# Patient Record
Sex: FEMALE | NOT HISPANIC OR LATINO
[De-identification: names, ages, dates, MRNs, and addresses within clinical notes are randomized per-mention and may not be internally consistent; named-entity substitution may affect disease eponyms.]

---

## 2021-01-25 ENCOUNTER — ERX REFILL RESPONSE (OUTPATIENT)
Age: 36
End: 2021-01-25

## 2021-01-25 RX ORDER — SULFASALAZINE 500 MG/1
TAKE 12 TABLETS BY ORAL ROUTE DAILY FOR 90 DAYS TABLET ORAL
Qty: 360 | Refills: 3

## 2021-05-03 ENCOUNTER — TELEPHONE ENCOUNTER (OUTPATIENT)
Dept: URBAN - METROPOLITAN AREA CLINIC 92 | Facility: CLINIC | Age: 36
End: 2021-05-03

## 2021-05-03 ENCOUNTER — OFFICE VISIT (OUTPATIENT)
Dept: URBAN - METROPOLITAN AREA TELEHEALTH 2 | Facility: TELEHEALTH | Age: 36
End: 2021-05-03
Payer: COMMERCIAL

## 2021-05-03 ENCOUNTER — LAB OUTSIDE AN ENCOUNTER (OUTPATIENT)
Dept: URBAN - METROPOLITAN AREA TELEHEALTH 2 | Facility: TELEHEALTH | Age: 36
End: 2021-05-03

## 2021-05-03 DIAGNOSIS — D72.820 LYMPHOCYTOSIS: ICD-10-CM

## 2021-05-03 DIAGNOSIS — K50.80 CROHN'S DISEASE OF BOTH SMALL AND LARGE INTESTINE: ICD-10-CM

## 2021-05-03 DIAGNOSIS — Z91.89 COLON CANCER HIGH RISK: ICD-10-CM

## 2021-05-03 PROCEDURE — 99215 OFFICE O/P EST HI 40 MIN: CPT | Performed by: INTERNAL MEDICINE

## 2021-05-03 RX ORDER — AZATHIOPRINE 50 MG/1
AS DIRECTED TABLET ORAL QD
Qty: 30 | Refills: 2 | OUTPATIENT
Start: 2021-05-03 | End: 2021-08-01

## 2021-05-03 RX ORDER — POLYETHYLENE GLYCOL 3350, SODIUM SULFATE, SODIUM CHLORIDE, POTASSIUM CHLORIDE, ASCORBIC ACID, SODIUM ASCORBATE 140-9-5.2G
1 KIT KIT ORAL ONCE
Qty: 1 | Refills: 1 | OUTPATIENT
Start: 2021-05-03 | End: 2021-05-05

## 2021-05-03 RX ORDER — SULFASALAZINE 500 MG/1
TAKE 12 TABLETS BY ORAL ROUTE DAILY FOR 90 DAYS TABLET ORAL
Qty: 360 | Refills: 3 | Status: ACTIVE | COMMUNITY

## 2021-05-03 NOTE — HPI-TODAY'S VISIT:
Mrs Lowery is a 35 year old  individual who presents for follow up of UC with sophie-rectal fistula, on sulfasalazine 6 grams and imuran 100 mg, (started 6/3/2015 and stopped ), here for follow-up. . She has a history of development of rashes on her face, legs, and neck in -10.  The symptoms would come and go.  No formal diagnosis was made.  She was given supportive management and the rash resolved. . She reports development of oral ulcers in 2013.  She had the symptoms for one month.  She used anti-virals (including valtrex). . She developed rectal bleeding in early .  She had a colonoscopy performed in 2014.  The biopsies revelaed infectious colitis. A colonoscopy was also performed 2014.  It revealed a normal terminal ileum, ulcerations within the ascending colon and hepatic flexure (with my interpretation of normal surrounding mucosa based on the pictures), and inflammation within the rectum.  Biopsies from the ascending colon showed some chronic inflammation consisting of lymphocytes and plasma cells being increased in the lamina propria.  The architectural changes and gland dropout were mild.  Possibility of UC was raised.  Biopsies of the rectum showed occasional neutrophils with no chronic inflammation or architectural changes.  The possibility of an infectious colitis or early IBD was raised. She started prednisone on 2015, 40mg, which has helped her oral ulcers.  Since starting the prednisone, her bowel movements improved, too. She was started on imuran 50mg and Lialda on 6/10/2015. We performed a colonoscopy on 2015 at [facility].  She had Grande 0 to 1 endoscopic disease with biopsies showing chronic colitis with mild architectural distortion in the rectum and normal biopsies throughout the remainder of the colon.  The biopsies, when compared to previous, were improved. She was also diagnosed with clostridium difficile and given a course of vancomycin and flagyl.  She had a colonoscopy performed on 2015.  She had Grande 0 to 1 endoscopic disease with biopsies showing chronic colitis with mild architectural distortion in the rectum and normal biopsies throughout the remainder of the colon.  The biopsies, when compared to previous, were improved. She was also diagnosed with clostridium difficile and given a course of vancomycin and flagyl. She had  delivery on 2016.  The rash she had developed on her skin only improved in 2018. Her UC has been stable; 1 bowel movement per day, no rectal bleeding.  She is 16 weeks pregnant at this time. Previously on 2020, pt reports that she has normal BM, no pain, no fistula drainage.  She has gone weeks without going on therapy.  Some fatigue. . Today on 5/3/2021, pt reports that after getting the second COVID shot, she had systemic response.  She also started to develop ulcers in her mouth as well.  She started back on the Imuran 2 weeks ago because of this.  No diarrhea, no rectal bleeding present.  Lot of pain as well in her throat.  Lymph nodes are swollen as well, lot of systemic fatigue.  Was given Medrol dose pack and augmentin plus norco, which helped her significantly. . PMH: Anal fissures, UC MEDICATIONS: Cymbalta 60mg; Luvox 100mg; Sulfasalazine . Social History:  No smoke or etoh; Lives with  in [place]; ; Phone:[phone] Family History:  Mom with colon polyp; no GI Malignancy or IBD . LABS/Studies:  [facility] labs: 2021: wbc 10.5, Hgb 13.6, Plt 155, ,  . Results for ALIREZA LOWERY (MRN [id number]) as of 2017 11:05  Ref. Range 2016 11:34 5/10/2017 12:19 Glucose Latest Ref Range: 74 - 106 mg/dL 84 91 Sodium Latest Ref Range: 136 - 145 mmol/L 137 143 Potassium Latest Ref Range: 3.4 - 4.5 mmol/L 4.4 4.8 (H) Chloride Latest Ref Range: 98 - 107 mmol/L 101 103 CO2 Latest Ref Range: 22 - 29 mmol/L 23 24 Anion Gap Latest Ref Range: 6.0 - 14.0 mmol/L 13.0 16.0 (H) BUN Latest Ref Range: 6.0 - 20.0 mg/dL 6.0 14.0 Creatinine Serum/WB Latest Ref Range: 0.51 - 0.95 mg/dL 0.49 (L) 0.61 eGFR Latest Units: ml/min/1.73m >60 >=60 Calcium Latest Ref Range: 8.6 - 10.2 mg/dL 8.9 9.2 Total Protein Latest Ref Range: 6.6 - 8.7 g/dL 6.7 7.3 Albumin Latest Ref Range: 3.5 - 5.5 g/dL 3.9 4.7 Alkaline Phosphatase Latest Ref Range: 35 - 104 U/L 73 94 Total Bilirubin Latest Ref Range: 0.15 - 1.00 mg/dL 0.33 0.28 ALT (SGPT) Latest Ref Range: 0 - 33 U/L 6 8 AST (SGOT) Latest Ref Range: 0 - 32 U/L 18 18 Ferritin Latest Ref Range: 13 - 150 ng/mL  98 Iron Latest Ref Range: 37 - 145 mcg/dL  53 Total Iron Binding Capacity Latest Ref Range: 250 - 450 mcg/dL  191 (L) Unsaturated Iron Binding Capacity Latest Ref Range: 112 - 346 ug/dL  138 Iron Saturation Latest Ref Range: 15 - 50 %  28 Vitamin B-12 Latest Ref Range: 213 - 816 pg/mL  373 White Blood Cell Count Latest Ref Range: 4.0 - 10.0 TH/cmm 15.0 (H) 6.8 Red Blood Cell Count Latest Ref Range: 3.80 - 4.80 M/cmm 3.91 4.17 Hemoglobin Latest Ref Range: 12.0 - 15.0 g/dL 12.9 13.3 Hematocrit Latest Ref Range: 36.0 - 46.0 % 39.9 41.8 MCV Latest Ref Range: 83.0 - 101.0 fl 102.0 (H) 100.2 MCH Latest Ref Range: 27.0 - 32.0 pg 33.0 (H) 31.9 MCHC Latest Ref Range: 31.5 - 34.5 g/dL 32.3 31.8 RDW - SD Latest Ref Range: 36.4 - 43.3 fL 47.4 (H) 45.1 (H) Platelet Count Latest Ref Range: 150 - 400 TH/cmm 126 (L) 160 MPV Latest Ref Range: 9.4 - 12.3 fl 11.6 11.8 Segmented Neutrophils Latest Ref Range: 44.0 - 65.0 % 75.5 (H) 56.1 Lymphocytes Latest Ref Range: 25.0 - 46.0 % 14.0 (L) 32.3 Monocytes Latest Ref Range: 1.0 - 10.0 % 4.5 6.0 Eosinophils Latest Ref Range: 0.0 - 9.0 % 1.0 4.7 Basophils Latest Ref Range: 0.0 - 4.0 % 0.7 0.6 IMMATURE GRANULOCYTES Latest Ref Range: 0.0 - 0.6 % 4.3 (H) 0.3 NUCLEATED RBC / AUTO DIFF Latest Ref Range: less than=0.0 /100WBC 0.0 0.0 SEGMENTED NEUTROPHILS ABSOLUTE / AUTO DIFF Latest Units: TH/cmm 11.35 3.80 LYMPHOCYTE ABSOLUTE / AUTO DIFF Latest Units: TH/cmm 2.10 2.19 MONOCYTES ABSOLUTE / AUTO DIFF Latest Units: TH/cmm 0.67 0.41 EOSINOPHIL ABSOLUTE / AUTO DIFF Latest Units: TH/cmm 0.15 0.32 BASOPHIL ABSOLUTE / AUTO DIFF Latest Units: TH/cmm 0.11 0.04 IMMATURE GRANULOCYTES ABSOLUTE / AUTO DIFF Latest Units: TH/cmm 0.65 0.02 NRBC ABSOLUTE / AUTO DIFF Latest Units: TH/cmm 0.00 0.00 C-Reactive Protein Latest Ref Range: 0.0 - 0.5 mg/dL 0.1  . MRE abd/pelv 2015: IMPRESSION:  The study is limited by a paucity of intraperitoneal fat. There are no definitive signs of bowel thickening or abnormal bowel enhancement to indicate inflammatory bowel disease.  Physiologic cysts on both ovaries. Nabothian cysts in the uterine cervix. Small polypoid lesion, seen within the endometrial canal measuring less than a centimeter, could represent a polyp or submucosal fibroid . Procedures:  Colonoscopy: 2017: - The sigmoid colon, descending colon, splenic flexure, transverse colon, hepatic flexure, ascending colon and cecum appeared normal. Biopsies were taken with a cold forceps for histology. This was biopsied with a cold forceps for histology. The pathology specimen was placed into Bottle Number 1. - A localized area of granular mucosa was found in the rectum. Small inflammatory polyp also biopsied. Biopsies were taken with a cold forceps for histology. The pathology specimen was placed into Bottle Number 2. 1.  COLON, NOS:  Colon mucosa.  No colitis or dysplasia identified. 2.  RECTUM:  Mild and focal mild to moderate active chronic proctitis with mild crypt distortion.  No dysplasia identified.  . 2015: Impressions: Normal terminal ileum. Multiple biopsies taken. Normal cecum, ascending colon, hepatic flexure, and transverse colon. Normal vascular pattern. Grande Score 0. Multiple biopsies taken. Normal sigmoid colon.  Normal vascular pattern. Grande Score 0. Multiple biopsies taken. Previous fistula in the rectum was not seen at this time. Mildly erythematous mucosa was found in the rectum. Grande Score 1. Four biopsies taken. Overall, there is significant improvement in appearance of mucosa. . Final Pathologic Diagnosis 1. ILEUM (BIOPSY): Ileal mucosa with no diagnostic alterations. 2. CECUM AND TRANSVERSE COLON (BIOPSY): Fragments of colonic mucosa with mild architectural distortion; no active colitis or dysplasia present; slides from [id number] (6/8/15) are reviewed and there has been an interval improvement in activity, which at that time showed mild to moderately active chronic colitis. 3. SIGMOID (BIOPSY): Fragments of colonic mucosa with no diagnostic alterations; no evidence of colitis; no dysplasia present; slides from [id number] (6/8/15) are reviewed and there has been an interval improvement in activity, which at that time showed moderately active chronic colitis.  4. RECTUM (BIOPSY): Chronic colitis with mild architectural distortion and increased inflammatory cells within the lamina propria; no active colitis identified; no dysplasia present; slides from [id number] (6/8/15) are reviewed and there has been an interval improvement in activity, which at that time showed moderately active chronic colitis. . 2015: Findings: The terminal ileum appeared to be unremarkable. Multiple biopsies were taken from the colon (jar 1). The cecum appeared to be unremarkable. Multiple cold forceps biopsies were taken from the colon  (jar 2). The ascending colon appeared to be unremarkable. Multiple cold forceps biopsies were taken from the colon (jar 3). The transverse colon appeared to be unremarkable. Multiple biopsies were taken from the colon (jar 4). The descending colon appeared to be unremarkable. Multiple biopsies were taken from the colon (jar 5). Mildly erythematous mucosa was found in the sigmoid colon. Multiple biopsies were taken (jar 6).  Mildly erythematous mucosa was found in the rectum. Multiple biopsies were taken (jar 7). A possible fistula was found in the rectum. There was some possible pus like material that was seen and sent for culture. There was a small ulcer at the site and biopsies were taken (jar 8). External hemorrhoids were found. Presence of possible anal fissure. . Final Pathologic Diagnosis 1. ILEUM, BIOPSY: SMALL BOWEL MUCOSA WITHOUT SIGNIFICANT HISTOPATHOLOGIC ABNORMALITY 2. CECUM, BIOPSY: CHRONIC ACTIVE COLITIS, MILD TO MODERATE 3. ASCENDING COLON, BIOPSY: CHRONIC ACTIVE COLITIS, MILD TO MODERATE PANNETH CELL METAPLASIA PRESENT 4. TRANSVERSE COLON, BIOPSY: CHRONIC ACTIVE COLITIS, MODERATE 5. DESCENDING COLON, BIOPSY: CHRONIC ACTIVE COLITIS, MODERATE RARE POORLY FORMED GRANULOMA PRESENT  6. SIGMOID COLON, BIOPSY: CHRONIC ACTIVE COLITIS, MODERATE RARE POORLY FORMED GRANULOMA PRESENT 7. RECTUM, BIOPSY: CHRONIC ACTIVE COLITIS, MARKED 8. FISTULA, BIOPSY: COLORECTAL MUCOSA SHOWING MODERATE CHRONIC ACTIVE COLITIS . Comment Although not definitive, the histologic findings favor ulcerative colitis over Crohn disease. Clinical correlation recommended. . 2014: Findings: Prep fair; small ulcer in TI--random bx taken x 4; Mild inflammation and friability in cecum--bx x 4; AC was unremarkable. Inflammation and friability in TC (bx taken x 8); friability and inflammation in DC and sigmoid colon --bx taken x 8). Minimal inflammation in rectum; Mild hemmorhoids in anal canal.   1. TERMINAL ILEUM, BIOPSY: Small intestinal mucosa with focal active enteritis. 2. CECUM, BIOPSY: Active colitis with cryptitis and without any crypt distortion most consistent with infectious colitis.  3. TRANSVERSE COLON, BIOPSY: Active colitis with cryptitis and without any crypt distortion most consistent with infectious colitis.  4. DESCENDING AND SIGMOID COLON, BIOPSY: Active colitis with cryptitis and without any crypt distortion most consistent with infectious colitis.  Note: Early inflammatory disease cannot be ruled out. Clinical correlation suggested.  . EGD:  2015: Impressions: Normal esophagus. Normal duodenal bulb, 1st portion of the duodenum, 2nd portion of the duodenum, and 3rd portion of the duodenum.  Multiple biopsies taken. Erythematous mucosa was found in the antrum. Four biopsies taken. A few ulcers were found in the body of the stomach (531.90). Multiple biopsies taken. Possible hypertensive portal gastropathy was found in the body of the stomach (572.8). 10. GASTRIC ANTRUM, BIOPSY: CHRONIC SUPERFICIAL GASTRITIS, MILD NO HELICOBACTER IDENITIFIED ON TOLUIDINE BLUE STAIN, WITH CONTROL RESPONDING APPROPRIATELY 11. GASTRIC BODY, BIOPSY: CHRONIC ACTIVE GASTRITIS, MILD NO HELICOBACTER IDENTIFIED ON TOLUIDINE BLUE STAIN .

## 2021-07-06 ENCOUNTER — WEB ENCOUNTER (OUTPATIENT)
Dept: URBAN - METROPOLITAN AREA CLINIC 98 | Facility: CLINIC | Age: 36
End: 2021-07-06

## 2021-07-06 ENCOUNTER — WEB ENCOUNTER (OUTPATIENT)
Dept: URBAN - METROPOLITAN AREA CLINIC 96 | Facility: CLINIC | Age: 36
End: 2021-07-06

## 2021-07-08 ENCOUNTER — CLAIMS CREATED FROM THE CLAIM WINDOW (OUTPATIENT)
Dept: URBAN - METROPOLITAN AREA CLINIC 4 | Facility: CLINIC | Age: 36
End: 2021-07-08
Payer: COMMERCIAL

## 2021-07-08 ENCOUNTER — OFFICE VISIT (OUTPATIENT)
Dept: URBAN - METROPOLITAN AREA SURGERY CENTER 18 | Facility: SURGERY CENTER | Age: 36
End: 2021-07-08
Payer: COMMERCIAL

## 2021-07-08 DIAGNOSIS — K63.89 POLYP OF ILEUM: ICD-10-CM

## 2021-07-08 DIAGNOSIS — K51.50 CHRONIC LEFT-SIDED ULCERATIVE COLITIS: ICD-10-CM

## 2021-07-08 DIAGNOSIS — K51.911 ULCERATIVE COLITIS, UNSPECIFIED WITH RECTAL BLEEDING: ICD-10-CM

## 2021-07-08 DIAGNOSIS — D12.2 BENIGN NEOPLASM OF ASCENDING COLON: ICD-10-CM

## 2021-07-08 DIAGNOSIS — D12.2 ADENOMA OF ASCENDING COLON: ICD-10-CM

## 2021-07-08 PROCEDURE — 88305 TISSUE EXAM BY PATHOLOGIST: CPT | Performed by: PATHOLOGY

## 2021-07-08 PROCEDURE — 45385 COLONOSCOPY W/LESION REMOVAL: CPT | Performed by: INTERNAL MEDICINE

## 2021-07-08 PROCEDURE — 88342 IMHCHEM/IMCYTCHM 1ST ANTB: CPT | Performed by: PATHOLOGY

## 2021-07-08 PROCEDURE — G8907 PT DOC NO EVENTS ON DISCHARG: HCPCS | Performed by: INTERNAL MEDICINE

## 2021-07-08 PROCEDURE — 88341 IMHCHEM/IMCYTCHM EA ADD ANTB: CPT | Performed by: PATHOLOGY

## 2021-07-08 PROCEDURE — 45380 COLONOSCOPY AND BIOPSY: CPT | Performed by: INTERNAL MEDICINE

## 2021-08-16 ENCOUNTER — OFFICE VISIT (OUTPATIENT)
Dept: URBAN - METROPOLITAN AREA TELEHEALTH 2 | Facility: TELEHEALTH | Age: 36
End: 2021-08-16
Payer: COMMERCIAL

## 2021-08-16 ENCOUNTER — TELEPHONE ENCOUNTER (OUTPATIENT)
Dept: URBAN - METROPOLITAN AREA CLINIC 92 | Facility: CLINIC | Age: 36
End: 2021-08-16

## 2021-08-16 DIAGNOSIS — D72.820 LYMPHOCYTOSIS: ICD-10-CM

## 2021-08-16 DIAGNOSIS — Z09 FOLLOW UP: ICD-10-CM

## 2021-08-16 DIAGNOSIS — K51.00 ULCERATIVE COLITIS, UNIVERSAL, WITHOUT COMPLICATIONS: ICD-10-CM

## 2021-08-16 DIAGNOSIS — E53.8 B12 DEFICIENCY: ICD-10-CM

## 2021-08-16 PROCEDURE — 99215 OFFICE O/P EST HI 40 MIN: CPT | Performed by: INTERNAL MEDICINE

## 2021-08-16 RX ORDER — PREDNISONE 10 MG/1
1 TABLET TABLET ORAL ONCE A DAY
Qty: 7 TABLET | Refills: 0 | OUTPATIENT
Start: 2021-08-16 | End: 2021-08-23

## 2021-08-16 RX ORDER — SULFASALAZINE 500 MG/1
TAKE 12 TABLETS BY ORAL ROUTE DAILY FOR 90 DAYS TABLET ORAL
Qty: 360 | Refills: 3 | COMMUNITY

## 2021-08-16 NOTE — HPI-TODAY'S VISIT:
Mrs Alexis is a 36 year old  individual who presents for follow up of pan-UC with sophie-rectal fistula, on sulfasalazine 6 grams and imuran 100 mg, (started 6/3/2015 and stopped ), here for follow-up. . She has a history of development of rashes on her face, legs, and neck in -10.  The symptoms would come and go.  No formal diagnosis was made.  She was given supportive management and the rash resolved. . She reports development of oral ulcers in 2013.  She had the symptoms for one month.  She used anti-virals (including valtrex). . She developed rectal bleeding in early .  She had a colonoscopy performed in 2014.  The biopsies revelaed infectious colitis. A colonoscopy was also performed 2014.  It revealed a normal terminal ileum, ulcerations within the ascending colon and hepatic flexure (with my interpretation of normal surrounding mucosa based on the pictures), and inflammation within the rectum.  Biopsies from the ascending colon showed some chronic inflammation consisting of lymphocytes and plasma cells being increased in the lamina propria.  The architectural changes and gland dropout were mild.  Possibility of UC was raised.  Biopsies of the rectum showed occasional neutrophils with no chronic inflammation or architectural changes.  The possibility of an infectious colitis or early IBD was raised. She started prednisone on 2015, 40mg, which has helped her oral ulcers.  Since starting the prednisone, her bowel movements improved, too. She was started on imuran 50mg and Lialda on 6/10/2015. We performed a colonoscopy on 2015 at [facility].  She had Grande 0 to 1 endoscopic disease with biopsies showing chronic colitis with mild architectural distortion in the rectum and normal biopsies throughout the remainder of the colon.  The biopsies, when compared to previous, were improved. She was also diagnosed with clostridium difficile and given a course of vancomycin and flagyl. . She had a colonoscopy performed on 2015.  She had Grande 0 to 1 endoscopic disease with biopsies showing chronic colitis with mild architectural distortion in the rectum and normal biopsies throughout the remainder of the colon.  The biopsies, when compared to previous, were improved. She was also diagnosed with clostridium difficile and given a course of vancomycin and flagyl. She had  delivery on 2016.  The rash she had developed on her skin only improved in 2018. Her UC has been stable; 1 bowel movement per day, no rectal bleeding.  She is 16 weeks pregnant at this time. . Previously on 2020, pt reports that she has normal BM, no pain, no fistula drainage.  She has gone weeks without going on therapy.  Some fatigue. . Previously on 5/3/2021, pt reports that after getting the second COVID shot, she had systemic response.  She also started to develop ulcers in her mouth as well.  She started back on the Imuran 2 weeks ago because of this.  No diarrhea, no rectal bleeding present.  Lot of pain as well in her throat.  Lymph nodes are swollen as well, lot of systemic fatigue.  Was given Medrol dose pack and augmentin plus norco, which helped her significantly. . Today on 2021, pt reports that she has had improvement in her flare that she has improved after taking the imuran . PMH: Anal fissures, UC MEDICATIONS: Cymbalta 60mg; Luvox 100mg; Sulfasalazine . Social History:  No smoke or etoh; Lives with  in [place]; ; Phone:[phone] Family History:  Mom with colon polyp; no GI Malignancy or IBD . LABS/Studies:  [facility] labs: 2021: wbc 10.5, Hgb 13.6, Plt 155, ,  . Results for BEVERLEY ALIREZA (MRN [id number]) as of 2017 11:05  Ref. Range 2016 11:34 5/10/2017 12:19 Glucose Latest Ref Range: 74 - 106 mg/dL 84 91 Sodium Latest Ref Range: 136 - 145 mmol/L 137 143 Potassium Latest Ref Range: 3.4 - 4.5 mmol/L 4.4 4.8 (H) Chloride Latest Ref Range: 98 - 107 mmol/L 101 103 CO2 Latest Ref Range: 22 - 29 mmol/L 23 24 Anion Gap Latest Ref Range: 6.0 - 14.0 mmol/L 13.0 16.0 (H) BUN Latest Ref Range: 6.0 - 20.0 mg/dL 6.0 14.0 Creatinine Serum/WB Latest Ref Range: 0.51 - 0.95 mg/dL 0.49 (L) 0.61 eGFR Latest Units: ml/min/1.73m >60 >=60 Calcium Latest Ref Range: 8.6 - 10.2 mg/dL 8.9 9.2 Total Protein Latest Ref Range: 6.6 - 8.7 g/dL 6.7 7.3 Albumin Latest Ref Range: 3.5 - 5.5 g/dL 3.9 4.7 Alkaline Phosphatase Latest Ref Range: 35 - 104 U/L 73 94 Total Bilirubin Latest Ref Range: 0.15 - 1.00 mg/dL 0.33 0.28 ALT (SGPT) Latest Ref Range: 0 - 33 U/L 6 8 AST (SGOT) Latest Ref Range: 0 - 32 U/L 18 18 Ferritin Latest Ref Range: 13 - 150 ng/mL  98 Iron Latest Ref Range: 37 - 145 mcg/dL  53 Total Iron Binding Capacity Latest Ref Range: 250 - 450 mcg/dL  191 (L) Unsaturated Iron Binding Capacity Latest Ref Range: 112 - 346 ug/dL  138 Iron Saturation Latest Ref Range: 15 - 50 %  28 Vitamin B-12 Latest Ref Range: 213 - 816 pg/mL  373 White Blood Cell Count Latest Ref Range: 4.0 - 10.0 TH/cmm 15.0 (H) 6.8 Red Blood Cell Count Latest Ref Range: 3.80 - 4.80 M/cmm 3.91 4.17 Hemoglobin Latest Ref Range: 12.0 - 15.0 g/dL 12.9 13.3 Hematocrit Latest Ref Range: 36.0 - 46.0 % 39.9 41.8 MCV Latest Ref Range: 83.0 - 101.0 fl 102.0 (H) 100.2 MCH Latest Ref Range: 27.0 - 32.0 pg 33.0 (H) 31.9 MCHC Latest Ref Range: 31.5 - 34.5 g/dL 32.3 31.8 RDW - SD Latest Ref Range: 36.4 - 43.3 fL 47.4 (H) 45.1 (H) Platelet Count Latest Ref Range: 150 - 400 TH/cmm 126 (L) 160 MPV Latest Ref Range: 9.4 - 12.3 fl 11.6 11.8 Segmented Neutrophils Latest Ref Range: 44.0 - 65.0 % 75.5 (H) 56.1 Lymphocytes Latest Ref Range: 25.0 - 46.0 % 14.0 (L) 32.3 Monocytes Latest Ref Range: 1.0 - 10.0 % 4.5 6.0 Eosinophils Latest Ref Range: 0.0 - 9.0 % 1.0 4.7 Basophils Latest Ref Range: 0.0 - 4.0 % 0.7 0.6 IMMATURE GRANULOCYTES Latest Ref Range: 0.0 - 0.6 % 4.3 (H) 0.3 NUCLEATED RBC / AUTO DIFF Latest Ref Range: less than=0.0 /100WBC 0.0 0.0 SEGMENTED NEUTROPHILS ABSOLUTE / AUTO DIFF Latest Units: TH/cmm 11.35 3.80 LYMPHOCYTE ABSOLUTE / AUTO DIFF Latest Units: TH/cmm 2.10 2.19 MONOCYTES ABSOLUTE / AUTO DIFF Latest Units: TH/cmm 0.67 0.41 EOSINOPHIL ABSOLUTE / AUTO DIFF Latest Units: TH/cmm 0.15 0.32 BASOPHIL ABSOLUTE / AUTO DIFF Latest Units: TH/cmm 0.11 0.04 IMMATURE GRANULOCYTES ABSOLUTE / AUTO DIFF Latest Units: TH/cmm 0.65 0.02 NRBC ABSOLUTE / AUTO DIFF Latest Units: TH/cmm 0.00 0.00 C-Reactive Protein Latest Ref Range: 0.0 - 0.5 mg/dL 0.1  . MRE abd/pelv 2015: IMPRESSION:  The study is limited by a paucity of intraperitoneal fat. There are no definitive signs of bowel thickening or abnormal bowel enhancement to indicate inflammatory bowel disease.  Physiologic cysts on both ovaries. Nabothian cysts in the uterine cervix. Small polypoid lesion, seen within the endometrial canal measuring less than a centimeter, could represent a polyp or submucosal fibroid . Procedures:  Colonoscopy: 2021: The ascending colon appeared normal. Biopsies were taken with a cold forceps for histology. The pathology specimen was placed into Bottle Number 1. Findings: The transverse colon appeared normal. Biopsies were taken with a cold forceps for histology. The pathology specimen was placed into Bottle Number 2. The descending colon appeared normal. Biopsies were taken with a cold forceps for histology. The pathology specimen was placed into Bottle Number 3. A polyp was found in the ascending colon. The polyp was sessile. The polyp was removed with a cold snare. It likely represents a benign inflammatory polyp. Resection and retrieval were complete. The pathology specimen was placed into Bottle Number 4.  . (A) Colon, Ascending, Biopsy: NO SIGNIFICANT ABNORMALITY. (B) Colon, Transverse, Biopsy: NO SIGNIFICANT ABNORMALITY. (C) Colon, Descending, Biopsy: NO SIGNIFICANT ABNORMALITY. (D) Colon, Ascending, Polypectomy: TUBULAR ADENOMA(S). (E) Rectum, Biopsy: DIFFUSE CHRONIC ACTIVE PROCTITIS, CONSISTENT WITH ULCERATIVE COLITIS. See Comment. Negative for Infectious Organisms, Dysplasia or Malignancy. COMMENT: Ulcerative colitis that is limited to the rectum has been referred to as ulcerative proctitis. This disease is thought to be less severe and to have a lower rate of dysplasia and adenocarcinoma than cases of ulcerative pan-colitis. . A diffuse area of mildly erythematous mucosa was found in the rectum. This was biopsied with a cold forceps for histology. The pathology specimen was placed into Bottle Number 5. Grande Score 1. . 2017: - The sigmoid colon, descending colon, splenic flexure, transverse colon, hepatic flexure, ascending colon and cecum appeared normal. Biopsies were taken with a cold forceps for histology. This was biopsied with a cold forceps for histology. The pathology specimen was placed into Bottle Number 1. - A localized area of granular mucosa was found in the rectum. Small inflammatory polyp also biopsied. Biopsies were taken with a cold forceps for histology. The pathology specimen was placed into Bottle Number 2. 1.  COLON, NOS:  Colon mucosa.  No colitis or dysplasia identified. 2.  RECTUM:  Mild and focal mild to moderate active chronic proctitis with mild crypt distortion.  No dysplasia identified.  . 2015: Impressions: Normal terminal ileum. Multiple biopsies taken. Normal cecum, ascending colon, hepatic flexure, and transverse colon. Normal vascular pattern. Grande Score 0. Multiple biopsies taken. Normal sigmoid colon.  Normal vascular pattern. Grande Score 0. Multiple biopsies taken. Previous fistula in the rectum was not seen at this time. Mildly erythematous mucosa was found in the rectum. Grande Score 1. Four biopsies taken. Overall, there is significant improvement in appearance of mucosa. . Final Pathologic Diagnosis 1. ILEUM (BIOPSY): Ileal mucosa with no diagnostic alterations. 2. CECUM AND TRANSVERSE COLON (BIOPSY): Fragments of colonic mucosa with mild architectural distortion; no active colitis or dysplasia present; slides from [id number] (6/8/15) are reviewed and there has been an interval improvement in activity, which at that time showed mild to moderately active chronic colitis. 3. SIGMOID (BIOPSY): Fragments of colonic mucosa with no diagnostic alterations; no evidence of colitis; no dysplasia present; slides from [id number] (6/8/15) are reviewed and there has been an interval improvement in activity, which at that time showed moderately active chronic colitis.  4. RECTUM (BIOPSY): Chronic colitis with mild architectural distortion and increased inflammatory cells within the lamina propria; no active colitis identified; no dysplasia present; slides from [id number] (6/8/15) are reviewed and there has been an interval improvement in activity, which at that time showed moderately active chronic colitis. . 2015: Findings: The terminal ileum appeared to be unremarkable. Multiple biopsies were taken from the colon (jar 1). The cecum appeared to be unremarkable. Multiple cold forceps biopsies were taken from the colon  (jar 2). The ascending colon appeared to be unremarkable. Multiple cold forceps biopsies were taken from the colon (jar 3). The transverse colon appeared to be unremarkable. Multiple biopsies were taken from the colon (jar 4). The descending colon appeared to be unremarkable. Multiple biopsies were taken from the colon (jar 5). Mildly erythematous mucosa was found in the sigmoid colon. Multiple biopsies were taken (jar 6).  Mildly erythematous mucosa was found in the rectum. Multiple biopsies were taken (jar 7). A possible fistula was found in the rectum. There was some possible pus like material that was seen and sent for culture. There was a small ulcer at the site and biopsies were taken (jar 8). External hemorrhoids were found. Presence of possible anal fissure. . Final Pathologic Diagnosis 1. ILEUM, BIOPSY: SMALL BOWEL MUCOSA WITHOUT SIGNIFICANT HISTOPATHOLOGIC ABNORMALITY 2. CECUM, BIOPSY: CHRONIC ACTIVE COLITIS, MILD TO MODERATE 3. ASCENDING COLON, BIOPSY: CHRONIC ACTIVE COLITIS, MILD TO MODERATE PANNETH CELL METAPLASIA PRESENT 4. TRANSVERSE COLON, BIOPSY: CHRONIC ACTIVE COLITIS, MODERATE 5. DESCENDING COLON, BIOPSY: CHRONIC ACTIVE COLITIS, MODERATE RARE POORLY FORMED GRANULOMA PRESENT  6. SIGMOID COLON, BIOPSY: CHRONIC ACTIVE COLITIS, MODERATE RARE POORLY FORMED GRANULOMA PRESENT 7. RECTUM, BIOPSY: CHRONIC ACTIVE COLITIS, MARKED 8. FISTULA, BIOPSY: COLORECTAL MUCOSA SHOWING MODERATE CHRONIC ACTIVE COLITIS . Comment Although not definitive, the histologic findings favor ulcerative colitis over Crohn disease. Clinical correlation recommended. . 2014: Findings: Prep fair; small ulcer in TI--random bx taken x 4; Mild inflammation and friability in cecum--bx x 4; AC was unremarkable. Inflammation and friability in TC (bx taken x 8); friability and inflammation in DC and sigmoid colon --bx taken x 8). Minimal inflammation in rectum; Mild hemmorhoids in anal canal.   1. TERMINAL ILEUM, BIOPSY: Small intestinal mucosa with focal active enteritis. 2. CECUM, BIOPSY: Active colitis with cryptitis and without any crypt distortion most consistent with infectious colitis.  3. TRANSVERSE COLON, BIOPSY: Active colitis with cryptitis and without any crypt distortion most consistent with infectious colitis.  4. DESCENDING AND SIGMOID COLON, BIOPSY: Active colitis with cryptitis and without any crypt distortion most consistent with infectious colitis.  Note: Early inflammatory disease cannot be ruled out. Clinical correlation suggested.  . EGD:  2015: Impressions: Normal esophagus. Normal duodenal bulb, 1st portion of the duodenum, 2nd portion of the duodenum, and 3rd portion of the duodenum.  Multiple biopsies taken. Erythematous mucosa was found in the antrum. Four biopsies taken. A few ulcers were found in the body of the stomach (531.90). Multiple biopsies taken. Possible hypertensive portal gastropathy was found in the body of the stomach (572.8). 10. GASTRIC ANTRUM, BIOPSY: CHRONIC SUPERFICIAL GASTRITIS, MILD NO HELICOBACTER IDENITIFIED ON TOLUIDINE BLUE STAIN, WITH CONTROL RESPONDING APPROPRIATELY 11. GASTRIC BODY, BIOPSY: CHRONIC ACTIVE GASTRITIS, MILD NO HELICOBACTER IDENTIFIED ON TOLUIDINE BLUE STAIN .

## 2021-08-16 NOTE — PHYSICAL EXAM EYES:
Conjuntivae and eyelids appear normal,  Sclerae : White without injection pt c/o headache, nausea x2 months. Pt at dialysis today had a full 3 hour session, vomited 3x during and after dialysis. L AV fistula, schedule Tues/Thurs/Sat.

## 2022-01-11 ENCOUNTER — TELEPHONE ENCOUNTER (OUTPATIENT)
Dept: URBAN - METROPOLITAN AREA CLINIC 92 | Facility: CLINIC | Age: 37
End: 2022-01-11

## 2022-01-11 RX ORDER — AZATHIOPRINE 50 MG/1
1 TAB TABLET ORAL QD
Qty: 30 TABLET | Refills: 6
Start: 2021-05-03 | End: 2022-08-09

## 2022-01-11 RX ORDER — SULFASALAZINE 500 MG/1
9 TABLET TABLET ORAL ONCE A DAY
Qty: 810 TABLET | Refills: 4

## 2022-06-14 ENCOUNTER — LAB OUTSIDE AN ENCOUNTER (OUTPATIENT)
Dept: URBAN - METROPOLITAN AREA CLINIC 92 | Facility: CLINIC | Age: 37
End: 2022-06-14

## 2022-06-14 ENCOUNTER — TELEPHONE ENCOUNTER (OUTPATIENT)
Dept: URBAN - METROPOLITAN AREA CLINIC 92 | Facility: CLINIC | Age: 37
End: 2022-06-14

## 2022-06-17 ENCOUNTER — TELEPHONE ENCOUNTER (OUTPATIENT)
Dept: URBAN - METROPOLITAN AREA CLINIC 92 | Facility: CLINIC | Age: 37
End: 2022-06-17

## 2022-07-29 ENCOUNTER — WEB ENCOUNTER (OUTPATIENT)
Dept: URBAN - METROPOLITAN AREA CLINIC 98 | Facility: CLINIC | Age: 37
End: 2022-07-29

## 2022-07-30 PROBLEM — 102485007 PERSONAL RISK FACTOR: Status: ACTIVE | Noted: 2022-07-30

## 2022-08-04 ENCOUNTER — OFFICE VISIT (OUTPATIENT)
Dept: URBAN - METROPOLITAN AREA SURGERY CENTER 18 | Facility: SURGERY CENTER | Age: 37
End: 2022-08-04

## 2022-08-08 ENCOUNTER — WEB ENCOUNTER (OUTPATIENT)
Dept: URBAN - METROPOLITAN AREA CLINIC 98 | Facility: CLINIC | Age: 37
End: 2022-08-08

## 2022-08-08 ENCOUNTER — WEB ENCOUNTER (OUTPATIENT)
Dept: URBAN - METROPOLITAN AREA CLINIC 96 | Facility: CLINIC | Age: 37
End: 2022-08-08

## 2022-08-08 RX ORDER — AZATHIOPRINE 50 MG/1
1 TAB TABLET ORAL QD
Qty: 30 TABLET | Refills: 6 | Status: ACTIVE | COMMUNITY
Start: 2021-05-03 | End: 2022-08-09

## 2022-08-08 RX ORDER — SULFASALAZINE 500 MG/1
9 TABLET TABLET ORAL ONCE A DAY
Qty: 810 TABLET | Refills: 4 | Status: ACTIVE | COMMUNITY

## 2022-08-08 RX ORDER — POLYETHYLENE GLYCOL 3350, SODIUM SULFATE, SODIUM CHLORIDE, POTASSIUM CHLORIDE, ASCORBIC ACID, SODIUM ASCORBATE 140-9-5.2G
1 KIT KIT ORAL ONCE
Qty: 1 | Refills: 1 | OUTPATIENT
Start: 2022-08-08 | End: 2022-08-10

## 2022-08-10 ENCOUNTER — WEB ENCOUNTER (OUTPATIENT)
Dept: URBAN - METROPOLITAN AREA SURGERY CENTER 18 | Facility: SURGERY CENTER | Age: 37
End: 2022-08-10

## 2022-08-11 ENCOUNTER — OFFICE VISIT (OUTPATIENT)
Dept: URBAN - METROPOLITAN AREA SURGERY CENTER 18 | Facility: SURGERY CENTER | Age: 37
End: 2022-08-11
Payer: COMMERCIAL

## 2022-08-11 ENCOUNTER — CLAIMS CREATED FROM THE CLAIM WINDOW (OUTPATIENT)
Dept: URBAN - METROPOLITAN AREA CLINIC 4 | Facility: CLINIC | Age: 37
End: 2022-08-11
Payer: COMMERCIAL

## 2022-08-11 ENCOUNTER — TELEPHONE ENCOUNTER (OUTPATIENT)
Dept: URBAN - METROPOLITAN AREA CLINIC 92 | Facility: CLINIC | Age: 37
End: 2022-08-11

## 2022-08-11 DIAGNOSIS — K51.00 ACUTE ULCERATIVE PANCOLITIS: ICD-10-CM

## 2022-08-11 DIAGNOSIS — K63.5 BENIGN COLON POLYP: ICD-10-CM

## 2022-08-11 DIAGNOSIS — K63.89 OTHER SPECIFIED DISEASES OF INTESTINE: ICD-10-CM

## 2022-08-11 DIAGNOSIS — K51.80 OTHER ULCERATIVE COLITIS WITHOUT COMPLICATIONS: ICD-10-CM

## 2022-08-11 PROCEDURE — 88305 TISSUE EXAM BY PATHOLOGIST: CPT | Performed by: PATHOLOGY

## 2022-08-11 PROCEDURE — G8907 PT DOC NO EVENTS ON DISCHARG: HCPCS | Performed by: INTERNAL MEDICINE

## 2022-08-11 PROCEDURE — 45385 COLONOSCOPY W/LESION REMOVAL: CPT | Performed by: INTERNAL MEDICINE

## 2022-08-11 PROCEDURE — 45380 COLONOSCOPY AND BIOPSY: CPT | Performed by: INTERNAL MEDICINE

## 2022-08-11 RX ORDER — SULFASALAZINE 500 MG/1
9 TABLET TABLET ORAL ONCE A DAY
Qty: 810 TABLET | Refills: 4 | Status: ACTIVE | COMMUNITY

## 2022-08-12 ENCOUNTER — WEB ENCOUNTER (OUTPATIENT)
Dept: URBAN - METROPOLITAN AREA CLINIC 98 | Facility: CLINIC | Age: 37
End: 2022-08-12

## 2022-08-31 ENCOUNTER — CLAIMS CREATED FROM THE CLAIM WINDOW (OUTPATIENT)
Dept: URBAN - METROPOLITAN AREA TELEHEALTH 2 | Facility: TELEHEALTH | Age: 37
End: 2022-08-31
Payer: COMMERCIAL

## 2022-08-31 VITALS — HEIGHT: 64 IN

## 2022-08-31 DIAGNOSIS — D72.819 WBC DECREASED: ICD-10-CM

## 2022-08-31 DIAGNOSIS — K51.00 ULCERATIVE COLITIS, UNIVERSAL, WITHOUT COMPLICATIONS: ICD-10-CM

## 2022-08-31 DIAGNOSIS — E53.8 B12 DEFICIENCY: ICD-10-CM

## 2022-08-31 PROBLEM — 67023009 LYMPHOCYTOSIS: Status: ACTIVE | Noted: 2022-08-31

## 2022-08-31 PROCEDURE — 99214 OFFICE O/P EST MOD 30 MIN: CPT | Performed by: INTERNAL MEDICINE

## 2022-08-31 RX ORDER — BALSALAZIDE DISODIUM 750 MG/1
2 CAPSULES CAPSULE ORAL ONCE DAILY
Qty: 60 | Refills: 11 | OUTPATIENT
Start: 2022-08-31 | End: 2023-08-26

## 2022-08-31 RX ORDER — SULFASALAZINE 500 MG/1
9 TABLET TABLET ORAL ONCE A DAY
Qty: 270 TABLET | Refills: 4 | OUTPATIENT
Start: 2022-08-31 | End: 2023-01-27

## 2022-08-31 RX ORDER — SULFASALAZINE 500 MG/1
9 TABLET TABLET ORAL ONCE A DAY
Qty: 810 TABLET | Refills: 4 | Status: ACTIVE | COMMUNITY

## 2022-08-31 RX ORDER — CHLORHEXIDINE GLUCONATE 1.2 MG/ML
10 ML RINSE ORAL BID
Qty: 600 ML | Refills: 2 | OUTPATIENT

## 2022-08-31 NOTE — HPI-TODAY'S VISIT:
Mrs Lowery is a 37 year old  individual who presents for follow up of pan-UC with sophie-rectal fistula, on sulfasalazine 9 tab, and imuran 100 mg, (started 6/3/2015 and stopped , restarted and stopped 2022), here for follow-up. . She has a history of development of rashes on her face, legs, and neck in -10.  The symptoms would come and go.  No formal diagnosis was made.  She was given supportive management and the rash resolved. . She reports development of oral ulcers in 2013.  She had the symptoms for one month.  She used anti-virals (including valtrex). . She developed rectal bleeding in early .  She had a colonoscopy performed in 2014.  The biopsies revelaed infectious colitis. A colonoscopy was also performed 2014.  It revealed a normal terminal ileum, ulcerations within the ascending colon and hepatic flexure (with my interpretation of normal surrounding mucosa based on the pictures), and inflammation within the rectum.  Biopsies from the ascending colon showed some chronic inflammation consisting of lymphocytes and plasma cells being increased in the lamina propria.  The architectural changes and gland dropout were mild.  Possibility of UC was raised.  Biopsies of the rectum showed occasional neutrophils with no chronic inflammation or architectural changes.  The possibility of an infectious colitis or early IBD was raised. She started prednisone on 2015, 40mg, which has helped her oral ulcers.  Since starting the prednisone, her bowel movements improved, too. She was started on imuran 50mg and Lialda on 6/10/2015. We performed a colonoscopy on 2015 at [facility].  She had Grande 0 to 1 endoscopic disease with biopsies showing chronic colitis with mild architectural distortion in the rectum and normal biopsies throughout the remainder of the colon.  The biopsies, when compared to previous, were improved. She was also diagnosed with clostridium difficile and given a course of vancomycin and flagyl. . She had a colonoscopy performed on 2015.  She had Grande 0 to 1 endoscopic disease with biopsies showing chronic colitis with mild architectural distortion in the rectum and normal biopsies throughout the remainder of the colon.  The biopsies, when compared to previous, were improved. She was also diagnosed with clostridium difficile and given a course of vancomycin and flagyl. She had  delivery on 2016.  The rash she had developed on her skin only improved in 2018. Her UC has been stable; 1 bowel movement per day, no rectal bleeding.  She is 16 weeks pregnant at this time. . Previously on 2020, pt reports that she has normal BM, no pain, no fistula drainage.  She has gone weeks without going on therapy.  Some fatigue. . Previously on 5/3/2021, pt reports that after getting the second COVID shot, she had systemic response.  She also started to develop ulcers in her mouth as well.  She started back on the Imuran 2 weeks ago because of this.  No diarrhea, no rectal bleeding present.  Lot of pain as well in her throat.  Lymph nodes are swollen as well, lot of systemic fatigue.  Was given Medrol dose pack and augmentin plus norco, which helped her significantly. Previously on 2021, pt reports that she has had improvement in her flare that she has improved after taking the imuran . Today on 2022, pt reports that she is doing very well; no diarrhea/no abd pain. . PMH: Anal fissures, UC MEDICATIONS: Cymbalta 60mg; Luvox 100mg; Sulfasalazine . Social History:  No smoke or etoh; Lives with  in [place]; ; Phone:[phone] Family History:  Mom with colon polyp; no GI Malignancy or IBD . LABS/Studies:  [facility] labs: 2021: wbc 10.5, Hgb 13.6, Plt 155, ,  . Results for ALIREZA LOWERY (MRN [id number]) as of 2017 11:05  Ref. Range 2016 11:34 5/10/2017 12:19 Glucose Latest Ref Range: 74 - 106 mg/dL 84 91 Sodium Latest Ref Range: 136 - 145 mmol/L 137 143 Potassium Latest Ref Range: 3.4 - 4.5 mmol/L 4.4 4.8 (H) Chloride Latest Ref Range: 98 - 107 mmol/L 101 103 CO2 Latest Ref Range: 22 - 29 mmol/L 23 24 Anion Gap Latest Ref Range: 6.0 - 14.0 mmol/L 13.0 16.0 (H) BUN Latest Ref Range: 6.0 - 20.0 mg/dL 6.0 14.0 Creatinine Serum/WB Latest Ref Range: 0.51 - 0.95 mg/dL 0.49 (L) 0.61 eGFR Latest Units: ml/min/1.73m >60 >=60 Calcium Latest Ref Range: 8.6 - 10.2 mg/dL 8.9 9.2 Total Protein Latest Ref Range: 6.6 - 8.7 g/dL 6.7 7.3 Albumin Latest Ref Range: 3.5 - 5.5 g/dL 3.9 4.7 Alkaline Phosphatase Latest Ref Range: 35 - 104 U/L 73 94 Total Bilirubin Latest Ref Range: 0.15 - 1.00 mg/dL 0.33 0.28 ALT (SGPT) Latest Ref Range: 0 - 33 U/L 6 8 AST (SGOT) Latest Ref Range: 0 - 32 U/L 18 18 Ferritin Latest Ref Range: 13 - 150 ng/mL  98 Iron Latest Ref Range: 37 - 145 mcg/dL  53 Total Iron Binding Capacity Latest Ref Range: 250 - 450 mcg/dL  191 (L) Unsaturated Iron Binding Capacity Latest Ref Range: 112 - 346 ug/dL  138 Iron Saturation Latest Ref Range: 15 - 50 %  28 Vitamin B-12 Latest Ref Range: 213 - 816 pg/mL  373 White Blood Cell Count Latest Ref Range: 4.0 - 10.0 TH/cmm 15.0 (H) 6.8 Red Blood Cell Count Latest Ref Range: 3.80 - 4.80 M/cmm 3.91 4.17 Hemoglobin Latest Ref Range: 12.0 - 15.0 g/dL 12.9 13.3 Hematocrit Latest Ref Range: 36.0 - 46.0 % 39.9 41.8 MCV Latest Ref Range: 83.0 - 101.0 fl 102.0 (H) 100.2 MCH Latest Ref Range: 27.0 - 32.0 pg 33.0 (H) 31.9 MCHC Latest Ref Range: 31.5 - 34.5 g/dL 32.3 31.8 RDW - SD Latest Ref Range: 36.4 - 43.3 fL 47.4 (H) 45.1 (H) Platelet Count Latest Ref Range: 150 - 400 TH/cmm 126 (L) 160 MPV Latest Ref Range: 9.4 - 12.3 fl 11.6 11.8 Segmented Neutrophils Latest Ref Range: 44.0 - 65.0 % 75.5 (H) 56.1 Lymphocytes Latest Ref Range: 25.0 - 46.0 % 14.0 (L) 32.3 Monocytes Latest Ref Range: 1.0 - 10.0 % 4.5 6.0 Eosinophils Latest Ref Range: 0.0 - 9.0 % 1.0 4.7 Basophils Latest Ref Range: 0.0 - 4.0 % 0.7 0.6 IMMATURE GRANULOCYTES Latest Ref Range: 0.0 - 0.6 % 4.3 (H) 0.3 NUCLEATED RBC / AUTO DIFF Latest Ref Range: less than=0.0 /100WBC 0.0 0.0 SEGMENTED NEUTROPHILS ABSOLUTE / AUTO DIFF Latest Units: TH/cmm 11.35 3.80 LYMPHOCYTE ABSOLUTE / AUTO DIFF Latest Units: TH/cmm 2.10 2.19 MONOCYTES ABSOLUTE / AUTO DIFF Latest Units: TH/cmm 0.67 0.41 EOSINOPHIL ABSOLUTE / AUTO DIFF Latest Units: TH/cmm 0.15 0.32 BASOPHIL ABSOLUTE / AUTO DIFF Latest Units: TH/cmm 0.11 0.04 IMMATURE GRANULOCYTES ABSOLUTE / AUTO DIFF Latest Units: TH/cmm 0.65 0.02 NRBC ABSOLUTE / AUTO DIFF Latest Units: TH/cmm 0.00 0.00 C-Reactive Protein Latest Ref Range: 0.0 - 0.5 mg/dL 0.1  . MRE abd/pelv 2015: IMPRESSION:  The study is limited by a paucity of intraperitoneal fat. There are no definitive signs of bowel thickening or abnormal bowel enhancement to indicate inflammatory bowel disease.  Physiologic cysts on both ovaries. Nabothian cysts in the uterine cervix. Small polypoid lesion, seen within the endometrial canal measuring less than a centimeter, could represent a polyp or submucosal fibroid . Procedures:  Colonoscopy: 2022:The cecum appeared normal. Biopsies were taken with a cold forceps for histology. The pathology specimen was placed into Bottle Number 1. Findings: A possible 10 mm polyp was found in the ascending colon. The polyp was flat. The polyp was removed with a combination of jumbo cold forceps and with a cold snare. Resection and retrieval were complete. The pathology specimen was placed into Bottle Number 2. To prevent bleeding after the polypectomy, one hemostatic clip was successfully placed. There was no bleeding at the end of the procedure. The ascending colon appeared normal. Biopsies were taken with a cold forceps for histology. The pathology specimen was placed into Bottle Number 3. The transverse colon appeared normal. Biopsies were taken with a cold forceps for histology. The pathology specimen was placed into Bottle Number 4. The descending colon appeared normal. Biopsies were taken with a cold forceps for histology. The pathology specimen was placed into Bottle Number 5. The sigmoid colon appeared normal. Biopsies were taken with a cold forceps for histology. The pathology specimen was placed into Bottle Number 6. The rectum appeared normal. The pathology specimen was placed into Bottle Number 7. Overall, very good control of disease state. . (A) Cecum, Biopsy: NO SIGNIFICANT ABNORMALITY. (B) Colon, Ascending, Polypectomy: HYPERPLASTIC POLYP(S). (C) Colon, Ascending, Biopsy: NO SIGNIFICANT ABNORMALITY. (D) Colon, Transverse, Biopsy: NO SIGNIFICANT ABNORMALITY. (E) Colon, Descending, Biopsy: NO SIGNIFICANT ABNORMALITY. (F) Colon, Sigmoid, Biopsy: PATCHY CHRONIC ACTIVE COLITIS, CONSISTENT WITH PARTIALLY TREATED ULCERATIVE COLITIS. Negative for Infectious Organisms, Dysplasia or Malignancy. (G) Rectum, Biopsy: NO SIGNIFICANT ABNORMALITY.  . 2021: The ascending colon appeared normal. Biopsies were taken with a cold forceps for histology. The pathology specimen was placed into Bottle Number 1. Findings: The transverse colon appeared normal. Biopsies were taken with a cold forceps for histology. The pathology specimen was placed into Bottle Number 2. The descending colon appeared normal. Biopsies were taken with a cold forceps for histology. The pathology specimen was placed into Bottle Number 3. A polyp was found in the ascending colon. The polyp was sessile. The polyp was removed with a cold snare. It likely represents a benign inflammatory polyp. Resection and retrieval were complete. The pathology specimen was placed into Bottle Number 4.  . (A) Colon, Ascending, Biopsy: NO SIGNIFICANT ABNORMALITY. (B) Colon, Transverse, Biopsy: NO SIGNIFICANT ABNORMALITY. (C) Colon, Descending, Biopsy: NO SIGNIFICANT ABNORMALITY. (D) Colon, Ascending, Polypectomy: TUBULAR ADENOMA(S). (E) Rectum, Biopsy: DIFFUSE CHRONIC ACTIVE PROCTITIS, CONSISTENT WITH ULCERATIVE COLITIS. See Comment. Negative for Infectious Organisms, Dysplasia or Malignancy. COMMENT: Ulcerative colitis that is limited to the rectum has been referred to as ulcerative proctitis. This disease is thought to be less severe and to have a lower rate of dysplasia and adenocarcinoma than cases of ulcerative pan-colitis. . A diffuse area of mildly erythematous mucosa was found in the rectum. This was biopsied with a cold forceps for histology. The pathology specimen was placed into Bottle Number 5. Grande Score 1. . 2017: - The sigmoid colon, descending colon, splenic flexure, transverse colon, hepatic flexure, ascending colon and cecum appeared normal. Biopsies were taken with a cold forceps for histology. This was biopsied with a cold forceps for histology. The pathology specimen was placed into Bottle Number 1. - A localized area of granular mucosa was found in the rectum. Small inflammatory polyp also biopsied. Biopsies were taken with a cold forceps for histology. The pathology specimen was placed into Bottle Number 2. 1.  COLON, NOS:  Colon mucosa.  No colitis or dysplasia identified. 2.  RECTUM:  Mild and focal mild to moderate active chronic proctitis with mild crypt distortion.  No dysplasia identified.  . 2015: Impressions: Normal terminal ileum. Multiple biopsies taken. Normal cecum, ascending colon, hepatic flexure, and transverse colon. Normal vascular pattern. Grande Score 0. Multiple biopsies taken. Normal sigmoid colon.  Normal vascular pattern. Grande Score 0. Multiple biopsies taken. Previous fistula in the rectum was not seen at this time. Mildly erythematous mucosa was found in the rectum. Grande Score 1. Four biopsies taken. Overall, there is significant improvement in appearance of mucosa. . Final Pathologic Diagnosis 1. ILEUM (BIOPSY): Ileal mucosa with no diagnostic alterations. 2. CECUM AND TRANSVERSE COLON (BIOPSY): Fragments of colonic mucosa with mild architectural distortion; no active colitis or dysplasia present; slides from [id number] (6/8/15) are reviewed and there has been an interval improvement in activity, which at that time showed mild to moderately active chronic colitis. 3. SIGMOID (BIOPSY): Fragments of colonic mucosa with no diagnostic alterations; no evidence of colitis; no dysplasia present; slides from [id number] (6/8/15) are reviewed and there has been an interval improvement in activity, which at that time showed moderately active chronic colitis.  4. RECTUM (BIOPSY): Chronic colitis with mild architectural distortion and increased inflammatory cells within the lamina propria; no active colitis identified; no dysplasia present; slides from [id number] (6/8/15) are reviewed and there has been an interval improvement in activity, which at that time showed moderately active chronic colitis. . 2015: Findings: The terminal ileum appeared to be unremarkable. Multiple biopsies were taken from the colon (jar 1). The cecum appeared to be unremarkable. Multiple cold forceps biopsies were taken from the colon  (jar 2). The ascending colon appeared to be unremarkable. Multiple cold forceps biopsies were taken from the colon (jar 3). The transverse colon appeared to be unremarkable. Multiple biopsies were taken from the colon (jar 4). The descending colon appeared to be unremarkable. Multiple biopsies were taken from the colon (jar 5). Mildly erythematous mucosa was found in the sigmoid colon. Multiple biopsies were taken (jar 6).  Mildly erythematous mucosa was found in the rectum. Multiple biopsies were taken (jar 7). A possible fistula was found in the rectum. There was some possible pus like material that was seen and sent for culture. There was a small ulcer at the site and biopsies were taken (jar 8). External hemorrhoids were found. Presence of possible anal fissure. . Final Pathologic Diagnosis 1. ILEUM, BIOPSY: SMALL BOWEL MUCOSA WITHOUT SIGNIFICANT HISTOPATHOLOGIC ABNORMALITY 2. CECUM, BIOPSY: CHRONIC ACTIVE COLITIS, MILD TO MODERATE 3. ASCENDING COLON, BIOPSY: CHRONIC ACTIVE COLITIS, MILD TO MODERATE PANNETH CELL METAPLASIA PRESENT 4. TRANSVERSE COLON, BIOPSY: CHRONIC ACTIVE COLITIS, MODERATE 5. DESCENDING COLON, BIOPSY: CHRONIC ACTIVE COLITIS, MODERATE RARE POORLY FORMED GRANULOMA PRESENT  6. SIGMOID COLON, BIOPSY: CHRONIC ACTIVE COLITIS, MODERATE RARE POORLY FORMED GRANULOMA PRESENT 7. RECTUM, BIOPSY: CHRONIC ACTIVE COLITIS, MARKED 8. FISTULA, BIOPSY: COLORECTAL MUCOSA SHOWING MODERATE CHRONIC ACTIVE COLITIS . Comment Although not definitive, the histologic findings favor ulcerative colitis over Crohn disease. Clinical correlation recommended. . 2014: Findings: Prep fair; small ulcer in TI--random bx taken x 4; Mild inflammation and friability in cecum--bx x 4; AC was unremarkable. Inflammation and friability in TC (bx taken x 8); friability and inflammation in DC and sigmoid colon --bx taken x 8). Minimal inflammation in rectum; Mild hemmorhoids in anal canal.   1. TERMINAL ILEUM, BIOPSY: Small intestinal mucosa with focal active enteritis. 2. CECUM, BIOPSY: Active colitis with cryptitis and without any crypt distortion most consistent with infectious colitis.  3. TRANSVERSE COLON, BIOPSY: Active colitis with cryptitis and without any crypt distortion most consistent with infectious colitis.  4. DESCENDING AND SIGMOID COLON, BIOPSY: Active colitis with cryptitis and without any crypt distortion most consistent with infectious colitis.  Note: Early inflammatory disease cannot be ruled out. Clinical correlation suggested.  . EGD:  2015: Impressions: Normal esophagus. Normal duodenal bulb, 1st portion of the duodenum, 2nd portion of the duodenum, and 3rd portion of the duodenum.  Multiple biopsies taken. Erythematous mucosa was found in the antrum. Four biopsies taken. A few ulcers were found in the body of the stomach (531.90). Multiple biopsies taken. Possible hypertensive portal gastropathy was found in the body of the stomach (572.8). 10. GASTRIC ANTRUM, BIOPSY: CHRONIC SUPERFICIAL GASTRITIS, MILD NO HELICOBACTER IDENITIFIED ON TOLUIDINE BLUE STAIN, WITH CONTROL RESPONDING APPROPRIATELY 11. GASTRIC BODY, BIOPSY: CHRONIC ACTIVE GASTRITIS, MILD NO HELICOBACTER IDENTIFIED ON TOLUIDINE BLUE STAIN

## 2023-02-21 ENCOUNTER — TELEPHONE ENCOUNTER (OUTPATIENT)
Dept: URBAN - METROPOLITAN AREA CLINIC 92 | Facility: CLINIC | Age: 38
End: 2023-02-21

## 2023-02-21 ENCOUNTER — OFFICE VISIT (OUTPATIENT)
Dept: URBAN - METROPOLITAN AREA TELEHEALTH 2 | Facility: TELEHEALTH | Age: 38
End: 2023-02-21
Payer: COMMERCIAL

## 2023-02-21 DIAGNOSIS — D72.818 OTHER DECREASED WHITE BLOOD CELL (WBC) COUNT: ICD-10-CM

## 2023-02-21 DIAGNOSIS — K51.00 ULCERATIVE COLITIS, UNIVERSAL, WITHOUT COMPLICATIONS: ICD-10-CM

## 2023-02-21 DIAGNOSIS — E53.8 B12 DEFICIENCY: ICD-10-CM

## 2023-02-21 DIAGNOSIS — Z09 FOLLOW UP: ICD-10-CM

## 2023-02-21 PROBLEM — 442159003 CHRONIC ULCERATIVE PANCOLITIS: Status: ACTIVE | Noted: 2021-05-03

## 2023-02-21 PROBLEM — 84828003 LEUKOPENIA: Status: ACTIVE | Noted: 2022-08-31

## 2023-02-21 PROCEDURE — 99215 OFFICE O/P EST HI 40 MIN: CPT | Performed by: INTERNAL MEDICINE

## 2023-02-21 RX ORDER — USTEKINUMAB 90 MG/ML
1 INJECTION INJECTION, SOLUTION SUBCUTANEOUS ONCE
Qty: 1 | Refills: 11 | OUTPATIENT
Start: 2023-02-21 | End: 2024-12-24

## 2023-02-21 RX ORDER — BALSALAZIDE DISODIUM 750 MG/1
2 CAPSULES CAPSULE ORAL ONCE DAILY
Qty: 60 | Refills: 11 | OUTPATIENT

## 2023-02-21 RX ORDER — BALSALAZIDE DISODIUM 750 MG/1
2 CAPSULES CAPSULE ORAL ONCE DAILY
Qty: 60 | Refills: 11 | Status: ACTIVE | COMMUNITY
Start: 2022-08-31 | End: 2023-08-26

## 2023-02-21 RX ORDER — PREDNISONE 2.5 MG/1
1 TABLET TABLET ORAL ONCE A DAY
Qty: 30 TABLET | Refills: 0 | OUTPATIENT
Start: 2023-02-21 | End: 2023-03-23

## 2023-02-21 RX ORDER — SULFASALAZINE 500 MG/1
9 TABLET TABLET ORAL ONCE A DAY
Qty: 810 TABLET | Refills: 4 | Status: ACTIVE | COMMUNITY

## 2023-02-21 RX ORDER — USTEKINUMAB 130 MG/26ML
AS DIRECTED SOLUTION INTRAVENOUS ONCE
Qty: 390 MILLIGRAMS | Refills: 0 | OUTPATIENT
Start: 2023-02-21 | End: 2023-02-22

## 2023-02-21 RX ORDER — CHLORHEXIDINE GLUCONATE 1.2 MG/ML
10 ML RINSE ORAL BID
Qty: 600 ML | Refills: 2 | Status: ACTIVE | COMMUNITY

## 2023-02-21 RX ORDER — CHLORHEXIDINE GLUCONATE 1.2 MG/ML
10 ML RINSE ORAL BID
Qty: 600 ML | Refills: 2 | OUTPATIENT

## 2023-02-21 NOTE — HPI-TODAY'S VISIT:
Mrs Alexis is a 37 year old  individual who presents for follow up of pan-UC with sophie-rectal fistula, on sulfasalazine 9 tab, and imuran 100 mg, (started 6/3/2015 and stopped , restarted and stopped 2022), here for follow-up. . She has a history of development of rashes on her face, legs, and neck in -10.  The symptoms would come and go.  No formal diagnosis was made.  She was given supportive management and the rash resolved. . She reports development of oral ulcers in 2013.  She had the symptoms for one month.  She used anti-virals (including valtrex). . She developed rectal bleeding in early .  She had a colonoscopy performed in 2014.  The biopsies revelaed infectious colitis. A colonoscopy was also performed 2014.  It revealed a normal terminal ileum, ulcerations within the ascending colon and hepatic flexure (with my interpretation of normal surrounding mucosa based on the pictures), and inflammation within the rectum.  Biopsies from the ascending colon showed some chronic inflammation consisting of lymphocytes and plasma cells being increased in the lamina propria.  The architectural changes and gland dropout were mild.  Possibility of UC was raised.  Biopsies of the rectum showed occasional neutrophils with no chronic inflammation or architectural changes.  The possibility of an infectious colitis or early IBD was raised. She started prednisone on 2015, 40mg, which has helped her oral ulcers.  Since starting the prednisone, her bowel movements improved, too. She was started on imuran 50mg and Lialda on 6/10/2015. We performed a colonoscopy on 2015 at [facility].  She had Grande 0 to 1 endoscopic disease with biopsies showing chronic colitis with mild architectural distortion in the rectum and normal biopsies throughout the remainder of the colon.  The biopsies, when compared to previous, were improved. She was also diagnosed with clostridium difficile and given a course of vancomycin and flagyl. . She had a colonoscopy performed on 2015.  She had Grande 0 to 1 endoscopic disease with biopsies showing chronic colitis with mild architectural distortion in the rectum and normal biopsies throughout the remainder of the colon.  The biopsies, when compared to previous, were improved. She was also diagnosed with clostridium difficile and given a course of vancomycin and flagyl. She had  delivery on 2016.  The rash she had developed on her skin only improved in 2018. Her UC has been stable; 1 bowel movement per day, no rectal bleeding.  She is 16 weeks pregnant at this time. . Previously on 2020, pt reports that she has normal BM, no pain, no fistula drainage.  She has gone weeks without going on therapy.  Some fatigue. . Previously on 5/3/2021, pt reports that after getting the second COVID shot, she had systemic response.  She also started to develop ulcers in her mouth as well.  She started back on the Imuran 2 weeks ago because of this.  No diarrhea, no rectal bleeding present.  Lot of pain as well in her throat.  Lymph nodes are swollen as well, lot of systemic fatigue.  Was given Medrol dose pack and augmentin plus norco, which helped her significantly. Previously on 2021, pt reports that she has had improvement in her flare that she has improved after taking the imuran . Previously on 2022, pt reports that she is doing very well; no diarrhea/no abd pain. . Today on 2023, pt reports that her GI is doing well, no diarrhea, no rectal bleeding.  But, she is having severe mouth ulcerations.  She was given nystatin with another medication mouth wash, which has helped.  She even stopped sulfa as well. . PMH: Anal fissures, UC MEDICATIONS: Cymbalta 60mg; Luvox 100mg; Sulfasalazine . Social History:  No smoke or etoh; Lives with  in [place]; ; Phone:[phone] Family History:  Mom with colon polyp; no GI Malignancy or IBD . LABS/Studies:  OSH labs: 2021: wbc 10.5, Hgb 13.6, Plt 155, ,  . Ref. Range 2016 11:34 5/10/2017 12:19 Glucose Latest Ref Range: 74 - 106 mg/dL 84 91 Sodium Latest Ref Range: 136 - 145 mmol/L 137 143 Potassium Latest Ref Range: 3.4 - 4.5 mmol/L 4.4 4.8 (H) Chloride Latest Ref Range: 98 - 107 mmol/L 101 103 CO2 Latest Ref Range: 22 - 29 mmol/L 23 24 Anion Gap Latest Ref Range: 6.0 - 14.0 mmol/L 13.0 16.0 (H) BUN Latest Ref Range: 6.0 - 20.0 mg/dL 6.0 14.0 Creatinine Serum/WB Latest Ref Range: 0.51 - 0.95 mg/dL 0.49 (L) 0.61 eGFR Latest Units: ml/min/1.73m >60 >=60 Calcium Latest Ref Range: 8.6 - 10.2 mg/dL 8.9 9.2 Total Protein Latest Ref Range: 6.6 - 8.7 g/dL 6.7 7.3 Albumin Latest Ref Range: 3.5 - 5.5 g/dL 3.9 4.7 Alkaline Phosphatase Latest Ref Range: 35 - 104 U/L 73 94 Total Bilirubin Latest Ref Range: 0.15 - 1.00 mg/dL 0.33 0.28 ALT (SGPT) Latest Ref Range: 0 - 33 U/L 6 8 AST (SGOT) Latest Ref Range: 0 - 32 U/L 18 18 Ferritin Latest Ref Range: 13 - 150 ng/mL  98 Iron Latest Ref Range: 37 - 145 mcg/dL  53 Total Iron Binding Capacity Latest Ref Range: 250 - 450 mcg/dL  191 (L) Unsaturated Iron Binding Capacity Latest Ref Range: 112 - 346 ug/dL  138 Iron Saturation Latest Ref Range: 15 - 50 %  28 Vitamin B-12 Latest Ref Range: 213 - 816 pg/mL  373 White Blood Cell Count Latest Ref Range: 4.0 - 10.0 TH/cmm 15.0 (H) 6.8 Red Blood Cell Count Latest Ref Range: 3.80 - 4.80 M/cmm 3.91 4.17 Hemoglobin Latest Ref Range: 12.0 - 15.0 g/dL 12.9 13.3 Hematocrit Latest Ref Range: 36.0 - 46.0 % 39.9 41.8 MCV Latest Ref Range: 83.0 - 101.0 fl 102.0 (H) 100.2 MCH Latest Ref Range: 27.0 - 32.0 pg 33.0 (H) 31.9 MCHC Latest Ref Range: 31.5 - 34.5 g/dL 32.3 31.8 RDW - SD Latest Ref Range: 36.4 - 43.3 fL 47.4 (H) 45.1 (H) Platelet Count Latest Ref Range: 150 - 400 TH/cmm 126 (L) 160 MPV Latest Ref Range: 9.4 - 12.3 fl 11.6 11.8 Segmented Neutrophils Latest Ref Range: 44.0 - 65.0 % 75.5 (H) 56.1 Lymphocytes Latest Ref Range: 25.0 - 46.0 % 14.0 (L) 32.3 Monocytes Latest Ref Range: 1.0 - 10.0 % 4.5 6.0 Eosinophils Latest Ref Range: 0.0 - 9.0 % 1.0 4.7 Basophils Latest Ref Range: 0.0 - 4.0 % 0.7 0.6 IMMATURE GRANULOCYTES Latest Ref Range: 0.0 - 0.6 % 4.3 (H) 0.3 NUCLEATED RBC / AUTO DIFF Latest Ref Range: less than=0.0 /100WBC 0.0 0.0 SEGMENTED NEUTROPHILS ABSOLUTE / AUTO DIFF Latest Units: TH/cmm 11.35 3.80 LYMPHOCYTE ABSOLUTE / AUTO DIFF Latest Units: TH/cmm 2.10 2.19 MONOCYTES ABSOLUTE / AUTO DIFF Latest Units: TH/cmm 0.67 0.41 EOSINOPHIL ABSOLUTE / AUTO DIFF Latest Units: TH/cmm 0.15 0.32 BASOPHIL ABSOLUTE / AUTO DIFF Latest Units: TH/cmm 0.11 0.04 IMMATURE GRANULOCYTES ABSOLUTE / AUTO DIFF Latest Units: TH/cmm 0.65 0.02 NRBC ABSOLUTE / AUTO DIFF Latest Units: TH/cmm 0.00 0.00 C-Reactive Protein Latest Ref Range: 0.0 - 0.5 mg/dL 0.1  . MRE abd/pelv 2015: IMPRESSION:  The study is limited by a paucity of intraperitoneal fat. There are no definitive signs of bowel thickening or abnormal bowel enhancement to indicate inflammatory bowel disease.  Physiologic cysts on both ovaries. Nabothian cysts in the uterine cervix. Small polypoid lesion, seen within the endometrial canal measuring less than a centimeter, could represent a polyp or submucosal fibroid . Procedures: Colonoscopy: 2022:The cecum appeared normal. Biopsies were taken with a cold forceps for histology. The pathology specimen was placed into Bottle Number 1. Findings: A possible 10 mm polyp was found in the ascending colon. The polyp was flat. The polyp was removed with a combination of jumbo cold forceps and with a cold snare. Resection and retrieval were complete. The pathology specimen was placed into Bottle Number 2. To prevent bleeding after the polypectomy, one hemostatic clip was successfully placed. There was no bleeding at the end of the procedure. The ascending colon appeared normal. Biopsies were taken with a cold forceps for histology. The pathology specimen was placed into Bottle Number 3. The transverse colon appeared normal. Biopsies were taken with a cold forceps for histology. The pathology specimen was placed into Bottle Number 4. The descending colon appeared normal. Biopsies were taken with a cold forceps for histology. The pathology specimen was placed into Bottle Number 5. The sigmoid colon appeared normal. Biopsies were taken with a cold forceps for histology. The pathology specimen was placed into Bottle Number 6. The rectum appeared normal. The pathology specimen was placed into Bottle Number 7. Overall, very good control of disease state. . (A) Cecum, Biopsy: NO SIGNIFICANT ABNORMALITY. (B) Colon, Ascending, Polypectomy: HYPERPLASTIC POLYP(S). (C) Colon, Ascending, Biopsy: NO SIGNIFICANT ABNORMALITY. (D) Colon, Transverse, Biopsy: NO SIGNIFICANT ABNORMALITY. (E) Colon, Descending, Biopsy: NO SIGNIFICANT ABNORMALITY. (F) Colon, Sigmoid, Biopsy: PATCHY CHRONIC ACTIVE COLITIS, CONSISTENT WITH PARTIALLY TREATED ULCERATIVE COLITIS. Negative for Infectious Organisms, Dysplasia or Malignancy. (G) Rectum, Biopsy: NO SIGNIFICANT ABNORMALITY. . 2021: The ascending colon appeared normal. Biopsies were taken with a cold forceps for histology. The pathology specimen was placed into Bottle Number 1. Findings: The transverse colon appeared normal. Biopsies were taken with a cold forceps for histology. The pathology specimen was placed into Bottle Number 2. The descending colon appeared normal. Biopsies were taken with a cold forceps for histology. The pathology specimen was placed into Bottle Number 3. A polyp was found in the ascending colon. The polyp was sessile. The polyp was removed with a cold snare. It likely represents a benign inflammatory polyp. Resection and retrieval were complete. The pathology specimen was placed into Bottle Number 4.  . (A) Colon, Ascending, Biopsy: NO SIGNIFICANT ABNORMALITY. (B) Colon, Transverse, Biopsy: NO SIGNIFICANT ABNORMALITY. (C) Colon, Descending, Biopsy: NO SIGNIFICANT ABNORMALITY. (D) Colon, Ascending, Polypectomy: TUBULAR ADENOMA(S). (E) Rectum, Biopsy: DIFFUSE CHRONIC ACTIVE PROCTITIS, CONSISTENT WITH ULCERATIVE COLITIS. See Comment. Negative for Infectious Organisms, Dysplasia or Malignancy. COMMENT: Ulcerative colitis that is limited to the rectum has been referred to as ulcerative proctitis. This disease is thought to be less severe and to have a lower rate of dysplasia and adenocarcinoma than cases of ulcerative pan-colitis. . A diffuse area of mildly erythematous mucosa was found in the rectum. This was biopsied with a cold forceps for histology. The pathology specimen was placed into Bottle Number 5. Grande Score 1. . 2017: - The sigmoid colon, descending colon, splenic flexure, transverse colon, hepatic flexure, ascending colon and cecum appeared normal. Biopsies were taken with a cold forceps for histology. This was biopsied with a cold forceps for histology. The pathology specimen was placed into Bottle Number 1. - A localized area of granular mucosa was found in the rectum. Small inflammatory polyp also biopsied. Biopsies were taken with a cold forceps for histology. The pathology specimen was placed into Bottle Number 2. 1.  COLON, NOS:  Colon mucosa.  No colitis or dysplasia identified. 2.  RECTUM:  Mild and focal mild to moderate active chronic proctitis with mild crypt distortion.  No dysplasia identified.  . 2015: Impressions: Normal terminal ileum. Multiple biopsies taken. Normal cecum, ascending colon, hepatic flexure, and transverse colon. Normal vascular pattern. Grande Score 0. Multiple biopsies taken. Normal sigmoid colon.  Normal vascular pattern. Grande Score 0. Multiple biopsies taken. Previous fistula in the rectum was not seen at this time. Mildly erythematous mucosa was found in the rectum. Grande Score 1. Four biopsies taken. Overall, there is significant improvement in appearance of mucosa. . Final Pathologic Diagnosis 1. ILEUM (BIOPSY): Ileal mucosa with no diagnostic alterations. 2. CECUM AND TRANSVERSE COLON (BIOPSY): Fragments of colonic mucosa with mild architectural distortion; no active colitis or dysplasia present; slides from [id number] (6/8/15) are reviewed and there has been an interval improvement in activity, which at that time showed mild to moderately active chronic colitis. 3. SIGMOID (BIOPSY): Fragments of colonic mucosa with no diagnostic alterations; no evidence of colitis; no dysplasia present; slides from [id number] (6/8/15) are reviewed and there has been an interval improvement in activity, which at that time showed moderately active chronic colitis.  4. RECTUM (BIOPSY): Chronic colitis with mild architectural distortion and increased inflammatory cells within the lamina propria; no active colitis identified; no dysplasia present; slides from [id number] (6/8/15) are reviewed and there has been an interval improvement in activity, which at that time showed moderately active chronic colitis. . 2015: Findings: The terminal ileum appeared to be unremarkable. Multiple biopsies were taken from the colon (jar 1). The cecum appeared to be unremarkable. Multiple cold forceps biopsies were taken from the colon  (jar 2). The ascending colon appeared to be unremarkable. Multiple cold forceps biopsies were taken from the colon (jar 3). The transverse colon appeared to be unremarkable. Multiple biopsies were taken from the colon (jar 4). The descending colon appeared to be unremarkable. Multiple biopsies were taken from the colon (jar 5). Mildly erythematous mucosa was found in the sigmoid colon. Multiple biopsies were taken (jar 6).  Mildly erythematous mucosa was found in the rectum. Multiple biopsies were taken (jar 7). A possible fistula was found in the rectum. There was some possible pus like material that was seen and sent for culture. There was a small ulcer at the site and biopsies were taken (jar 8). External hemorrhoids were found. Presence of possible anal fissure. . Final Pathologic Diagnosis 1. ILEUM, BIOPSY: SMALL BOWEL MUCOSA WITHOUT SIGNIFICANT HISTOPATHOLOGIC ABNORMALITY 2. CECUM, BIOPSY: CHRONIC ACTIVE COLITIS, MILD TO MODERATE 3. ASCENDING COLON, BIOPSY: CHRONIC ACTIVE COLITIS, MILD TO MODERATE PANNETH CELL METAPLASIA PRESENT 4. TRANSVERSE COLON, BIOPSY: CHRONIC ACTIVE COLITIS, MODERATE 5. DESCENDING COLON, BIOPSY: CHRONIC ACTIVE COLITIS, MODERATE RARE POORLY FORMED GRANULOMA PRESENT  6. SIGMOID COLON, BIOPSY: CHRONIC ACTIVE COLITIS, MODERATE RARE POORLY FORMED GRANULOMA PRESENT 7. RECTUM, BIOPSY: CHRONIC ACTIVE COLITIS, MARKED 8. FISTULA, BIOPSY: COLORECTAL MUCOSA SHOWING MODERATE CHRONIC ACTIVE COLITIS . Comment Although not definitive, the histologic findings favor ulcerative colitis over Crohn disease. Clinical correlation recommended. . 2014: Findings: Prep fair; small ulcer in TI--random bx taken x 4; Mild inflammation and friability in cecum--bx x 4; AC was unremarkable. Inflammation and friability in TC (bx taken x 8); friability and inflammation in DC and sigmoid colon --bx taken x 8). Minimal inflammation in rectum; Mild hemmorhoids in anal canal.   1. TERMINAL ILEUM, BIOPSY: Small intestinal mucosa with focal active enteritis. 2. CECUM, BIOPSY: Active colitis with cryptitis and without any crypt distortion most consistent with infectious colitis.  3. TRANSVERSE COLON, BIOPSY: Active colitis with cryptitis and without any crypt distortion most consistent with infectious colitis.  4. DESCENDING AND SIGMOID COLON, BIOPSY: Active colitis with cryptitis and without any crypt distortion most consistent with infectious colitis.  Note: Early inflammatory disease cannot be ruled out. Clinical correlation suggested.  . EGD:  2015: Impressions: Normal esophagus. Normal duodenal bulb, 1st portion of the duodenum, 2nd portion of the duodenum, and 3rd portion of the duodenum.  Multiple biopsies taken. Erythematous mucosa was found in the antrum. Four biopsies taken. A few ulcers were found in the body of the stomach (531.90). Multiple biopsies taken. Possible hypertensive portal gastropathy was found in the body of the stomach (572.8). 10. GASTRIC ANTRUM, BIOPSY: CHRONIC SUPERFICIAL GASTRITIS, MILD NO HELICOBACTER IDENITIFIED ON TOLUIDINE BLUE STAIN, WITH CONTROL RESPONDING APPROPRIATELY 11. GASTRIC BODY, BIOPSY: CHRONIC ACTIVE GASTRITIS, MILD NO HELICOBACTER IDENTIFIED ON TOLUIDINE BLUE STAIN Mrs Alexis is a 37 year old  individual who presents for follow up of pan-UC with sophie-rectal fistula, on sulfasalazine 9 tab, and imuran 100 mg, (started 6/3/2015 and stopped , restarted and stopped 2022), here for follow-up. . She has a history of development of rashes on her face, legs, and neck in -10.  The symptoms would come and go.  No formal diagnosis was made.  She was given supportive management and the rash resolved. . She reports development of oral ulcers in 2013.  She had the symptoms for one month.  She used anti-virals (including valtrex). . She developed rectal bleeding in early .  She had a colonoscopy performed in 2014.  The biopsies revelaed infectious colitis. A colonoscopy was also performed 2014.  It revealed a normal terminal ileum, ulcerations within the ascending colon and hepatic flexure (with my interpretation of normal surrounding mucosa based on the pictures), and inflammation within the rectum.  Biopsies from the ascending colon showed some chronic inflammation consisting of lymphocytes and plasma cells being increased in the lamina propria.  The architectural changes and gland dropout were mild.  Possibility of UC was raised.  Biopsies of the rectum showed occasional neutrophils with no chronic inflammation or architectural changes.  The possibility of an infectious colitis or early IBD was raised. She started prednisone on 2015, 40mg, which has helped her oral ulcers.  Since starting the prednisone, her bowel movements improved, too. She was started on imuran 50mg and Lialda on 6/10/2015. We performed a colonoscopy on 2015 at [facility].  She had Grande 0 to 1 endoscopic disease with biopsies showing chronic colitis with mild architectural distortion in the rectum and normal biopsies throughout the remainder of the colon.  The biopsies, when compared to previous, were improved. She was also diagnosed with clostridium difficile and given a course of vancomycin and flagyl. . She had a colonoscopy performed on 2015.  She had Grande 0 to 1 endoscopic disease with biopsies showing chronic colitis with mild architectural distortion in the rectum and normal biopsies throughout the remainder of the colon.  The biopsies, when compared to previous, were improved. She was also diagnosed with clostridium difficile and given a course of vancomycin and flagyl. She had  delivery on 2016.  The rash she had developed on her skin only improved in 2018. Her UC has been stable; 1 bowel movement per day, no rectal bleeding.  She is 16 weeks pregnant at this time. . Previously on 2020, pt reports that she has normal BM, no pain, no fistula drainage.  She has gone weeks without going on therapy.  Some fatigue. . Previously on 5/3/2021, pt reports that after getting the second COVID shot, she had systemic response.  She also started to develop ulcers in her mouth as well.  She started back on the Imuran 2 weeks ago because of this.  No diarrhea, no rectal bleeding present.  Lot of pain as well in her throat.  Lymph nodes are swollen as well, lot of systemic fatigue.  Was given Medrol dose pack and augmentin plus norco, which helped her significantly. Previously on 2021, pt reports that she has had improvement in her flare that she has improved after taking the imuran . Previously on 2022, pt reports that she is doing very well; no diarrhea/no abd pain. . Today on 2023, pt reports that her GI is doing well, no diarrhea, no rectal bleeding.  But, she is having severe mouth ulcerations.  She was given nystatin with steroid mouth wash, which has helped. . PMH: Anal fissures, UC MEDICATIONS: Cymbalta 60mg; Luvox 100mg; Sulfasalazine . Social History:  No smoke or etoh; Lives with  in [place]; ; Phone:[phone] Family History:  Mom with colon polyp; no GI Malignancy or IBD . LABS/Studies:  OS labs: 2021: wbc 10.5, Hgb 13.6, Plt 155, ,  . Ref. Range 2016 11:34 5/10/2017 12:19 Glucose Latest Ref Range: 74 - 106 mg/dL 84 91 Sodium Latest Ref Range: 136 - 145 mmol/L 137 143 Potassium Latest Ref Range: 3.4 - 4.5 mmol/L 4.4 4.8 (H) Chloride Latest Ref Range: 98 - 107 mmol/L 101 103 CO2 Latest Ref Range: 22 - 29 mmol/L 23 24 Anion Gap Latest Ref Range: 6.0 - 14.0 mmol/L 13.0 16.0 (H) BUN Latest Ref Range: 6.0 - 20.0 mg/dL 6.0 14.0 Creatinine Serum/WB Latest Ref Range: 0.51 - 0.95 mg/dL 0.49 (L) 0.61 eGFR Latest Units: ml/min/1.73m >60 >=60 Calcium Latest Ref Range: 8.6 - 10.2 mg/dL 8.9 9.2 Total Protein Latest Ref Range: 6.6 - 8.7 g/dL 6.7 7.3 Albumin Latest Ref Range: 3.5 - 5.5 g/dL 3.9 4.7 Alkaline Phosphatase Latest Ref Range: 35 - 104 U/L 73 94 Total Bilirubin Latest Ref Range: 0.15 - 1.00 mg/dL 0.33 0.28 ALT (SGPT) Latest Ref Range: 0 - 33 U/L 6 8 AST (SGOT) Latest Ref Range: 0 - 32 U/L 18 18 Ferritin Latest Ref Range: 13 - 150 ng/mL  98 Iron Latest Ref Range: 37 - 145 mcg/dL  53 Total Iron Binding Capacity Latest Ref Range: 250 - 450 mcg/dL  191 (L) Unsaturated Iron Binding Capacity Latest Ref Range: 112 - 346 ug/dL  138 Iron Saturation Latest Ref Range: 15 - 50 %  28 Vitamin B-12 Latest Ref Range: 213 - 816 pg/mL  373 White Blood Cell Count Latest Ref Range: 4.0 - 10.0 TH/cmm 15.0 (H) 6.8 Red Blood Cell Count Latest Ref Range: 3.80 - 4.80 M/cmm 3.91 4.17 Hemoglobin Latest Ref Range: 12.0 - 15.0 g/dL 12.9 13.3 Hematocrit Latest Ref Range: 36.0 - 46.0 % 39.9 41.8 MCV Latest Ref Range: 83.0 - 101.0 fl 102.0 (H) 100.2 MCH Latest Ref Range: 27.0 - 32.0 pg 33.0 (H) 31.9 MCHC Latest Ref Range: 31.5 - 34.5 g/dL 32.3 31.8 RDW - SD Latest Ref Range: 36.4 - 43.3 fL 47.4 (H) 45.1 (H) Platelet Count Latest Ref Range: 150 - 400 TH/cmm 126 (L) 160 MPV Latest Ref Range: 9.4 - 12.3 fl 11.6 11.8 Segmented Neutrophils Latest Ref Range: 44.0 - 65.0 % 75.5 (H) 56.1 Lymphocytes Latest Ref Range: 25.0 - 46.0 % 14.0 (L) 32.3 Monocytes Latest Ref Range: 1.0 - 10.0 % 4.5 6.0 Eosinophils Latest Ref Range: 0.0 - 9.0 % 1.0 4.7 Basophils Latest Ref Range: 0.0 - 4.0 % 0.7 0.6 IMMATURE GRANULOCYTES Latest Ref Range: 0.0 - 0.6 % 4.3 (H) 0.3 NUCLEATED RBC / AUTO DIFF Latest Ref Range: less than=0.0 /100WBC 0.0 0.0 SEGMENTED NEUTROPHILS ABSOLUTE / AUTO DIFF Latest Units: TH/cmm 11.35 3.80 LYMPHOCYTE ABSOLUTE / AUTO DIFF Latest Units: TH/cmm 2.10 2.19 MONOCYTES ABSOLUTE / AUTO DIFF Latest Units: TH/cmm 0.67 0.41 EOSINOPHIL ABSOLUTE / AUTO DIFF Latest Units: TH/cmm 0.15 0.32 BASOPHIL ABSOLUTE / AUTO DIFF Latest Units: TH/cmm 0.11 0.04 IMMATURE GRANULOCYTES ABSOLUTE / AUTO DIFF Latest Units: TH/cmm 0.65 0.02 NRBC ABSOLUTE / AUTO DIFF Latest Units: TH/cmm 0.00 0.00 C-Reactive Protein Latest Ref Range: 0.0 - 0.5 mg/dL 0.1  . MRE abd/pelv 2015: IMPRESSION:  The study is limited by a paucity of intraperitoneal fat. There are no definitive signs of bowel thickening or abnormal bowel enhancement to indicate inflammatory bowel disease.  Physiologic cysts on both ovaries. Nabothian cysts in the uterine cervix. Small polypoid lesion, seen within the endometrial canal measuring less than a centimeter, could represent a polyp or submucosal fibroid . Procedures: Colonoscopy: 2022:The cecum appeared normal. Biopsies were taken with a cold forceps for histology. The pathology specimen was placed into Bottle Number 1. Findings: A possible 10 mm polyp was found in the ascending colon. The polyp was flat. The polyp was removed with a combination of jumbo cold forceps and with a cold snare. Resection and retrieval were complete. The pathology specimen was placed into Bottle Number 2. To prevent bleeding after the polypectomy, one hemostatic clip was successfully placed. There was no bleeding at the end of the procedure. The ascending colon appeared normal. Biopsies were taken with a cold forceps for histology. The pathology specimen was placed into Bottle Number 3. The transverse colon appeared normal. Biopsies were taken with a cold forceps for histology. The pathology specimen was placed into Bottle Number 4. The descending colon appeared normal. Biopsies were taken with a cold forceps for histology. The pathology specimen was placed into Bottle Number 5. The sigmoid colon appeared normal. Biopsies were taken with a cold forceps for histology. The pathology specimen was placed into Bottle Number 6. The rectum appeared normal. The pathology specimen was placed into Bottle Number 7. Overall, very good control of disease state. . (A) Cecum, Biopsy: NO SIGNIFICANT ABNORMALITY. (B) Colon, Ascending, Polypectomy: HYPERPLASTIC POLYP(S). (C) Colon, Ascending, Biopsy: NO SIGNIFICANT ABNORMALITY. (D) Colon, Transverse, Biopsy: NO SIGNIFICANT ABNORMALITY. (E) Colon, Descending, Biopsy: NO SIGNIFICANT ABNORMALITY. (F) Colon, Sigmoid, Biopsy: PATCHY CHRONIC ACTIVE COLITIS, CONSISTENT WITH PARTIALLY TREATED ULCERATIVE COLITIS. Negative for Infectious Organisms, Dysplasia or Malignancy. (G) Rectum, Biopsy: NO SIGNIFICANT ABNORMALITY. . 2021: The ascending colon appeared normal. Biopsies were taken with a cold forceps for histology. The pathology specimen was placed into Bottle Number 1. Findings: The transverse colon appeared normal. Biopsies were taken with a cold forceps for histology. The pathology specimen was placed into Bottle Number 2. The descending colon appeared normal. Biopsies were taken with a cold forceps for histology. The pathology specimen was placed into Bottle Number 3. A polyp was found in the ascending colon. The polyp was sessile. The polyp was removed with a cold snare. It likely represents a benign inflammatory polyp. Resection and retrieval were complete. The pathology specimen was placed into Bottle Number 4.  . (A) Colon, Ascending, Biopsy: NO SIGNIFICANT ABNORMALITY. (B) Colon, Transverse, Biopsy: NO SIGNIFICANT ABNORMALITY. (C) Colon, Descending, Biopsy: NO SIGNIFICANT ABNORMALITY. (D) Colon, Ascending, Polypectomy: TUBULAR ADENOMA(S). (E) Rectum, Biopsy: DIFFUSE CHRONIC ACTIVE PROCTITIS, CONSISTENT WITH ULCERATIVE COLITIS. See Comment. Negative for Infectious Organisms, Dysplasia or Malignancy. COMMENT: Ulcerative colitis that is limited to the rectum has been referred to as ulcerative proctitis. This disease is thought to be less severe and to have a lower rate of dysplasia and adenocarcinoma than cases of ulcerative pan-colitis. . A diffuse area of mildly erythematous mucosa was found in the rectum. This was biopsied with a cold forceps for histology. The pathology specimen was placed into Bottle Number 5. Grande Score 1. . 2017: - The sigmoid colon, descending colon, splenic flexure, transverse colon, hepatic flexure, ascending colon and cecum appeared normal. Biopsies were taken with a cold forceps for histology. This was biopsied with a cold forceps for histology. The pathology specimen was placed into Bottle Number 1. - A localized area of granular mucosa was found in the rectum. Small inflammatory polyp also biopsied. Biopsies were taken with a cold forceps for histology. The pathology specimen was placed into Bottle Number 2. 1.  COLON, NOS:  Colon mucosa.  No colitis or dysplasia identified. 2.  RECTUM:  Mild and focal mild to moderate active chronic proctitis with mild crypt distortion.  No dysplasia identified.  . 2015: Impressions: Normal terminal ileum. Multiple biopsies taken. Normal cecum, ascending colon, hepatic flexure, and transverse colon. Normal vascular pattern. Grande Score 0. Multiple biopsies taken. Normal sigmoid colon.  Normal vascular pattern. Grande Score 0. Multiple biopsies taken. Previous fistula in the rectum was not seen at this time. Mildly erythematous mucosa was found in the rectum. Grande Score 1. Four biopsies taken. Overall, there is significant improvement in appearance of mucosa. . Final Pathologic Diagnosis 1. ILEUM (BIOPSY): Ileal mucosa with no diagnostic alterations. 2. CECUM AND TRANSVERSE COLON (BIOPSY): Fragments of colonic mucosa with mild architectural distortion; no active colitis or dysplasia present; slides from [id number] (6/8/15) are reviewed and there has been an interval improvement in activity, which at that time showed mild to moderately active chronic colitis. 3. SIGMOID (BIOPSY): Fragments of colonic mucosa with no diagnostic alterations; no evidence of colitis; no dysplasia present; slides from [id number] (6/8/15) are reviewed and there has been an interval improvement in activity, which at that time showed moderately active chronic colitis.  4. RECTUM (BIOPSY): Chronic colitis with mild architectural distortion and increased inflammatory cells within the lamina propria; no active colitis identified; no dysplasia present; slides from [id number] (6/8/15) are reviewed and there has been an interval improvement in activity, which at that time showed moderately active chronic colitis. . 2015: Findings: The terminal ileum appeared to be unremarkable. Multiple biopsies were taken from the colon (jar 1). The cecum appeared to be unremarkable. Multiple cold forceps biopsies were taken from the colon  (jar 2). The ascending colon appeared to be unremarkable. Multiple cold forceps biopsies were taken from the colon (jar 3). The transverse colon appeared to be unremarkable. Multiple biopsies were taken from the colon (jar 4). The descending colon appeared to be unremarkable. Multiple biopsies were taken from the colon (jar 5). Mildly erythematous mucosa was found in the sigmoid colon. Multiple biopsies were taken (jar 6).  Mildly erythematous mucosa was found in the rectum. Multiple biopsies were taken (jar 7). A possible fistula was found in the rectum. There was some possible pus like material that was seen and sent for culture. There was a small ulcer at the site and biopsies were taken (jar 8). External hemorrhoids were found. Presence of possible anal fissure. . Final Pathologic Diagnosis 1. ILEUM, BIOPSY: SMALL BOWEL MUCOSA WITHOUT SIGNIFICANT HISTOPATHOLOGIC ABNORMALITY 2. CECUM, BIOPSY: CHRONIC ACTIVE COLITIS, MILD TO MODERATE 3. ASCENDING COLON, BIOPSY: CHRONIC ACTIVE COLITIS, MILD TO MODERATE PANNETH CELL METAPLASIA PRESENT 4. TRANSVERSE COLON, BIOPSY: CHRONIC ACTIVE COLITIS, MODERATE 5. DESCENDING COLON, BIOPSY: CHRONIC ACTIVE COLITIS, MODERATE RARE POORLY FORMED GRANULOMA PRESENT  6. SIGMOID COLON, BIOPSY: CHRONIC ACTIVE COLITIS, MODERATE RARE POORLY FORMED GRANULOMA PRESENT 7. RECTUM, BIOPSY: CHRONIC ACTIVE COLITIS, MARKED 8. FISTULA, BIOPSY: COLORECTAL MUCOSA SHOWING MODERATE CHRONIC ACTIVE COLITIS . Comment Although not definitive, the histologic findings favor ulcerative colitis over Crohn disease. Clinical correlation recommended. . 2014: Findings: Prep fair; small ulcer in TI--random bx taken x 4; Mild inflammation and friability in cecum--bx x 4; AC was unremarkable. Inflammation and friability in TC (bx taken x 8); friability and inflammation in DC and sigmoid colon --bx taken x 8). Minimal inflammation in rectum; Mild hemmorhoids in anal canal.   1. TERMINAL ILEUM, BIOPSY: Small intestinal mucosa with focal active enteritis. 2. CECUM, BIOPSY: Active colitis with cryptitis and without any crypt distortion most consistent with infectious colitis.  3. TRANSVERSE COLON, BIOPSY: Active colitis with cryptitis and without any crypt distortion most consistent with infectious colitis.  4. DESCENDING AND SIGMOID COLON, BIOPSY: Active colitis with cryptitis and without any crypt distortion most consistent with infectious colitis.  Note: Early inflammatory disease cannot be ruled out. Clinical correlation suggested.  . EGD:  2015: Impressions: Normal esophagus. Normal duodenal bulb, 1st portion of the duodenum, 2nd portion of the duodenum, and 3rd portion of the duodenum.  Multiple biopsies taken. Erythematous mucosa was found in the antrum. Four biopsies taken. A few ulcers were found in the body of the stomach (531.90). Multiple biopsies taken. Possible hypertensive portal gastropathy was found in the body of the stomach (572.8). 10. GASTRIC ANTRUM, BIOPSY: CHRONIC SUPERFICIAL GASTRITIS, MILD NO HELICOBACTER IDENITIFIED ON TOLUIDINE BLUE STAIN, WITH CONTROL RESPONDING APPROPRIATELY 11. GASTRIC BODY, BIOPSY: CHRONIC ACTIVE GASTRITIS, MILD NO HELICOBACTER IDENTIFIED ON TOLUIDINE BLUE STAIN

## 2023-03-02 ENCOUNTER — TELEPHONE ENCOUNTER (OUTPATIENT)
Dept: URBAN - METROPOLITAN AREA CLINIC 92 | Facility: CLINIC | Age: 38
End: 2023-03-02

## 2023-03-02 RX ORDER — USTEKINUMAB 90 MG/ML
1 INJECTION INJECTION, SOLUTION SUBCUTANEOUS ONCE
Qty: 1 | Refills: 11
Start: 2023-02-21 | End: 2025-01-02

## 2023-03-31 ENCOUNTER — DASHBOARD ENCOUNTERS (OUTPATIENT)
Age: 38
End: 2023-03-31

## 2023-03-31 ENCOUNTER — CLAIMS CREATED FROM THE CLAIM WINDOW (OUTPATIENT)
Dept: URBAN - METROPOLITAN AREA TELEHEALTH 2 | Facility: TELEHEALTH | Age: 38
End: 2023-03-31
Payer: COMMERCIAL

## 2023-03-31 ENCOUNTER — WEB ENCOUNTER (OUTPATIENT)
Dept: URBAN - METROPOLITAN AREA CLINIC 98 | Facility: CLINIC | Age: 38
End: 2023-03-31

## 2023-03-31 DIAGNOSIS — K12.1 ORAL ULCER: ICD-10-CM

## 2023-03-31 DIAGNOSIS — K51.00 ULCERATIVE COLITIS, UNIVERSAL, WITHOUT COMPLICATIONS: ICD-10-CM

## 2023-03-31 DIAGNOSIS — E53.8 B12 DEFICIENCY: ICD-10-CM

## 2023-03-31 DIAGNOSIS — D72.819 WBC DECREASED: ICD-10-CM

## 2023-03-31 DIAGNOSIS — Z09 FOLLOW UP: ICD-10-CM

## 2023-03-31 DIAGNOSIS — K51.80 CHRONIC PANCOLONIC ULCERATIVE COLITIS: ICD-10-CM

## 2023-03-31 PROCEDURE — 99214 OFFICE O/P EST MOD 30 MIN: CPT | Performed by: INTERNAL MEDICINE

## 2023-03-31 RX ORDER — CHLORHEXIDINE GLUCONATE 1.2 MG/ML
10 ML RINSE ORAL BID
Qty: 600 ML | Refills: 2 | Status: ACTIVE | COMMUNITY

## 2023-03-31 RX ORDER — SULFASALAZINE 500 MG/1
9 TABLET TABLET ORAL ONCE A DAY
Qty: 810 TABLET | Refills: 4 | Status: ACTIVE | COMMUNITY

## 2023-03-31 RX ORDER — USTEKINUMAB 90 MG/ML
1 INJECTION INJECTION, SOLUTION SUBCUTANEOUS ONCE
Qty: 1 | Refills: 11 | Status: ACTIVE | COMMUNITY
Start: 2023-02-21 | End: 2025-01-02

## 2023-03-31 RX ORDER — BALSALAZIDE DISODIUM 750 MG/1
2 CAPSULES CAPSULE ORAL ONCE DAILY
Qty: 60 | Refills: 11 | Status: ACTIVE | COMMUNITY

## 2023-03-31 RX ORDER — USTEKINUMAB 90 MG/ML
1 INJECTION INJECTION, SOLUTION SUBCUTANEOUS ONCE
Qty: 1 | Refills: 11 | OUTPATIENT

## 2023-04-30 ENCOUNTER — WEB ENCOUNTER (OUTPATIENT)
Dept: URBAN - METROPOLITAN AREA CLINIC 98 | Facility: CLINIC | Age: 38
End: 2023-04-30

## 2023-05-01 ENCOUNTER — WEB ENCOUNTER (OUTPATIENT)
Dept: URBAN - METROPOLITAN AREA CLINIC 98 | Facility: CLINIC | Age: 38
End: 2023-05-01

## 2023-05-01 ENCOUNTER — TELEPHONE ENCOUNTER (OUTPATIENT)
Dept: URBAN - METROPOLITAN AREA CLINIC 103 | Facility: CLINIC | Age: 38
End: 2023-05-01

## 2023-05-02 ENCOUNTER — WEB ENCOUNTER (OUTPATIENT)
Dept: URBAN - METROPOLITAN AREA CLINIC 98 | Facility: CLINIC | Age: 38
End: 2023-05-02

## 2023-05-17 ENCOUNTER — TELEPHONE ENCOUNTER (OUTPATIENT)
Dept: URBAN - METROPOLITAN AREA CLINIC 97 | Facility: CLINIC | Age: 38
End: 2023-05-17

## 2023-05-19 ENCOUNTER — TELEPHONE ENCOUNTER (OUTPATIENT)
Dept: URBAN - METROPOLITAN AREA CLINIC 6 | Facility: CLINIC | Age: 38
End: 2023-05-19

## 2023-05-19 ENCOUNTER — WEB ENCOUNTER (OUTPATIENT)
Dept: URBAN - METROPOLITAN AREA CLINIC 6 | Facility: CLINIC | Age: 38
End: 2023-05-19

## 2024-01-18 ENCOUNTER — WEB ENCOUNTER (OUTPATIENT)
Dept: URBAN - METROPOLITAN AREA CLINIC 96 | Facility: CLINIC | Age: 39
End: 2024-01-18

## 2024-01-18 RX ORDER — SULFASALAZINE 500 MG/1
9 TABLET TABLET ORAL ONCE A DAY
Qty: 810 TABLET | Refills: 4
End: 2025-04-13

## 2024-07-28 ENCOUNTER — WEB ENCOUNTER (OUTPATIENT)
Dept: URBAN - METROPOLITAN AREA CLINIC 98 | Facility: CLINIC | Age: 39
End: 2024-07-28

## 2024-07-30 ENCOUNTER — LAB OUTSIDE AN ENCOUNTER (OUTPATIENT)
Dept: URBAN - METROPOLITAN AREA CLINIC 98 | Facility: CLINIC | Age: 39
End: 2024-07-30

## 2024-09-23 ENCOUNTER — LAB OUTSIDE AN ENCOUNTER (OUTPATIENT)
Dept: URBAN - METROPOLITAN AREA TELEHEALTH 2 | Facility: TELEHEALTH | Age: 39
End: 2024-09-23

## 2024-09-23 ENCOUNTER — OFFICE VISIT (OUTPATIENT)
Dept: URBAN - METROPOLITAN AREA TELEHEALTH 2 | Facility: TELEHEALTH | Age: 39
End: 2024-09-23
Payer: COMMERCIAL

## 2024-09-23 ENCOUNTER — TELEPHONE ENCOUNTER (OUTPATIENT)
Dept: URBAN - METROPOLITAN AREA CLINIC 96 | Facility: CLINIC | Age: 39
End: 2024-09-23

## 2024-09-23 DIAGNOSIS — E53.8 B12 DEFICIENCY: ICD-10-CM

## 2024-09-23 DIAGNOSIS — K51.80 CHRONIC PANCOLONIC ULCERATIVE COLITIS: ICD-10-CM

## 2024-09-23 PROCEDURE — 99213 OFFICE O/P EST LOW 20 MIN: CPT | Performed by: INTERNAL MEDICINE

## 2024-09-23 RX ORDER — SULFASALAZINE 500 MG/1
9 TABLET TABLET ORAL ONCE A DAY
Qty: 810 TABLET | Refills: 4 | COMMUNITY
End: 2025-04-13

## 2024-09-23 RX ORDER — CHLORHEXIDINE GLUCONATE 1.2 MG/ML
10 ML RINSE ORAL BID
Qty: 600 ML | Refills: 2 | COMMUNITY

## 2024-09-23 RX ORDER — BALSALAZIDE DISODIUM 750 MG/1
2 CAPSULES CAPSULE ORAL ONCE DAILY
Qty: 60 | Refills: 11 | COMMUNITY

## 2024-09-23 RX ORDER — USTEKINUMAB 90 MG/ML
1 INJECTION INJECTION, SOLUTION SUBCUTANEOUS ONCE
Qty: 1 | Refills: 11 | COMMUNITY

## 2024-09-23 NOTE — HPI-TODAY'S VISIT:
Mrs Alexis is a 39 year old  individual who presents for follow up of pan-UC with sophie-rectal fistula, on currently on hold sulfasalazine 9 tab, and currently on hold imuran 100 mg, (started 6/3/2015 and stopped , restarted and stopped 2022), here for follow-up. . She has a history of development of rashes on her face, legs, and neck in -10.  The symptoms would come and go.  No formal diagnosis was made.  She was given supportive management and the rash resolved. . She reports development of oral ulcers in 2013.  She had the symptoms for one month.  She used anti-virals (including valtrex). . She developed rectal bleeding in early .  She had a colonoscopy performed in 2014.  The biopsies revelaed infectious colitis. A colonoscopy was also performed 2014.  It revealed a normal terminal ileum, ulcerations within the ascending colon and hepatic flexure (with my interpretation of normal surrounding mucosa based on the pictures), and inflammation within the rectum.  Biopsies from the ascending colon showed some chronic inflammation consisting of lymphocytes and plasma cells being increased in the lamina propria.  The architectural changes and gland dropout were mild.  Possibility of UC was raised.  Biopsies of the rectum showed occasional neutrophils with no chronic inflammation or architectural changes.  The possibility of an infectious colitis or early IBD was raised. She started prednisone on 2015, 40mg, which has helped her oral ulcers.  Since starting the prednisone, her bowel movements improved, too. She was started on imuran 50mg and Lialda on 6/10/2015. We performed a colonoscopy on 2015 at [facility].  She had Grande 0 to 1 endoscopic disease with biopsies showing chronic colitis with mild architectural distortion in the rectum and normal biopsies throughout the remainder of the colon.  The biopsies, when compared to previous, were improved. She was also diagnosed with clostridium difficile and given a course of vancomycin and flagyl. . She had a colonoscopy performed on 2015.  She had Grande 0 to 1 endoscopic disease with biopsies showing chronic colitis with mild architectural distortion in the rectum and normal biopsies throughout the remainder of the colon.  The biopsies, when compared to previous, were improved. She was also diagnosed with clostridium difficile and given a course of vancomycin and flagyl. She had  delivery on 2016.  The rash she had developed on her skin only improved in 2018. Her UC has been stable; 1 bowel movement per day, no rectal bleeding.  She is 16 weeks pregnant at this time. . Previously on 2020, pt reports that she has normal BM, no pain, no fistula drainage.  She has gone weeks without going on therapy.  Some fatigue. . Previously on 5/3/2021, pt reports that after getting the second COVID shot, she had systemic response.  She also started to develop ulcers in her mouth as well.  She started back on the Imuran 2 weeks ago because of this.  No diarrhea, no rectal bleeding present.  Lot of pain as well in her throat.  Lymph nodes are swollen as well, lot of systemic fatigue.  Was given Medrol dose pack and augmentin plus norco, which helped her significantly. Previously on 2021, pt reports that she has had improvement in her flare that she has improved after taking the imuran . Previously on 2022, pt reports that she is doing very well; no diarrhea/no abd pain. Previously on 2023, pt reports that her GI is doing well, no diarrhea, no rectal bleeding.  But, she is having severe mouth ulcerations.  She was given nystatin with another medication mouth wash, which has helped.  She even stopped sulfa as well. Prev on 3/31/2023, pt reports that her GI sxs are still very well controlled.  However, mouth ulcers are coming back, dentist is going to give her steroid.  She forgot to restart the sulfasalazine (we did trial of stopping to see if any impact on her mouth ulcers). . Today on 2024, she has not been taking any medications (sulfasalazine or imuran); started VSL#3.  No diarrhea, no blood in the stool. . PMH: Anal fissures, UC MEDICATIONS: Cymbalta 60mg; Luvox 100mg; Sulfasalazine . Social History:  No smoke or etoh; Lives with  in [place]; ; Phone:[phone] Family History:  Mom with colon polyp; no GI Malignancy or IBD . LABS/Studies:  [facility] labs: 2021: wbc 10.5, Hgb 13.6, Plt 155, ,  . Ref. Range 2016 11:34 5/10/2017 12:19 Glucose Latest Ref Range: 74 - 106 mg/dL 84 91 Sodium Latest Ref Range: 136 - 145 mmol/L 137 143 Potassium Latest Ref Range: 3.4 - 4.5 mmol/L 4.4 4.8 (H) Chloride Latest Ref Range: 98 - 107 mmol/L 101 103 CO2 Latest Ref Range: 22 - 29 mmol/L 23 24 Anion Gap Latest Ref Range: 6.0 - 14.0 mmol/L 13.0 16.0 (H) BUN Latest Ref Range: 6.0 - 20.0 mg/dL 6.0 14.0 Creatinine Serum/WB Latest Ref Range: 0.51 - 0.95 mg/dL 0.49 (L) 0.61 eGFR Latest Units: ml/min/1.73m >60 >=60 Calcium Latest Ref Range: 8.6 - 10.2 mg/dL 8.9 9.2 Total Protein Latest Ref Range: 6.6 - 8.7 g/dL 6.7 7.3 Albumin Latest Ref Range: 3.5 - 5.5 g/dL 3.9 4.7 Alkaline Phosphatase Latest Ref Range: 35 - 104 U/L 73 94 Total Bilirubin Latest Ref Range: 0.15 - 1.00 mg/dL 0.33 0.28 ALT (SGPT) Latest Ref Range: 0 - 33 U/L 6 8 AST (SGOT) Latest Ref Range: 0 - 32 U/L 18 18 Ferritin Latest Ref Range: 13 - 150 ng/mL  98 Iron Latest Ref Range: 37 - 145 mcg/dL  53 Total Iron Binding Capacity Latest Ref Range: 250 - 450 mcg/dL  191 (L) Unsaturated Iron Binding Capacity Latest Ref Range: 112 - 346 ug/dL  138 Iron Saturation Latest Ref Range: 15 - 50 %  28 Vitamin B-12 Latest Ref Range: 213 - 816 pg/mL  373 White Blood Cell Count Latest Ref Range: 4.0 - 10.0 TH/cmm 15.0 (H) 6.8 Red Blood Cell Count Latest Ref Range: 3.80 - 4.80 M/cmm 3.91 4.17 Hemoglobin Latest Ref Range: 12.0 - 15.0 g/dL 12.9 13.3 Hematocrit Latest Ref Range: 36.0 - 46.0 % 39.9 41.8 MCV Latest Ref Range: 83.0 - 101.0 fl 102.0 (H) 100.2 MCH Latest Ref Range: 27.0 - 32.0 pg 33.0 (H) 31.9 MCHC Latest Ref Range: 31.5 - 34.5 g/dL 32.3 31.8 RDW - SD Latest Ref Range: 36.4 - 43.3 fL 47.4 (H) 45.1 (H) Platelet Count Latest Ref Range: 150 - 400 TH/cmm 126 (L) 160 MPV Latest Ref Range: 9.4 - 12.3 fl 11.6 11.8 Segmented Neutrophils Latest Ref Range: 44.0 - 65.0 % 75.5 (H) 56.1 Lymphocytes Latest Ref Range: 25.0 - 46.0 % 14.0 (L) 32.3 Monocytes Latest Ref Range: 1.0 - 10.0 % 4.5 6.0 Eosinophils Latest Ref Range: 0.0 - 9.0 % 1.0 4.7 Basophils Latest Ref Range: 0.0 - 4.0 % 0.7 0.6 IMMATURE GRANULOCYTES Latest Ref Range: 0.0 - 0.6 % 4.3 (H) 0.3 NUCLEATED RBC / AUTO DIFF Latest Ref Range: less than=0.0 /100WBC 0.0 0.0 SEGMENTED NEUTROPHILS ABSOLUTE / AUTO DIFF Latest Units: TH/cmm 11.35 3.80 LYMPHOCYTE ABSOLUTE / AUTO DIFF Latest Units: TH/cmm 2.10 2.19 MONOCYTES ABSOLUTE / AUTO DIFF Latest Units: TH/cmm 0.67 0.41 EOSINOPHIL ABSOLUTE / AUTO DIFF Latest Units: TH/cmm 0.15 0.32 BASOPHIL ABSOLUTE / AUTO DIFF Latest Units: TH/cmm 0.11 0.04 IMMATURE GRANULOCYTES ABSOLUTE / AUTO DIFF Latest Units: TH/cmm 0.65 0.02 NRBC ABSOLUTE / AUTO DIFF Latest Units: TH/cmm 0.00 0.00 C-Reactive Protein Latest Ref Range: 0.0 - 0.5 mg/dL 0.1  . MRE abd/pelv 2015: IMPRESSION:  The study is limited by a paucity of intraperitoneal fat. There are no definitive signs of bowel thickening or abnormal bowel enhancement to indicate inflammatory bowel disease.  Physiologic cysts on both ovaries. Nabothian cysts in the uterine cervix. Small polypoid lesion, seen within the endometrial canal measuring less than a centimeter, could represent a polyp or submucosal fibroid . Procedures: Colonoscopy: 2022:The cecum appeared normal. Biopsies were taken with a cold forceps for histology. The pathology specimen was placed into Bottle Number 1. Findings: A possible 10 mm polyp was found in the ascending colon. The polyp was flat. The polyp was removed with a combination of jumbo cold forceps and with a cold snare. Resection and retrieval were complete. The pathology specimen was placed into Bottle Number 2. To prevent bleeding after the polypectomy, one hemostatic clip was successfully placed. There was no bleeding at the end of the procedure. The ascending colon appeared normal. Biopsies were taken with a cold forceps for histology. The pathology specimen was placed into Bottle Number 3. The transverse colon appeared normal. Biopsies were taken with a cold forceps for histology. The pathology specimen was placed into Bottle Number 4. The descending colon appeared normal. Biopsies were taken with a cold forceps for histology. The pathology specimen was placed into Bottle Number 5. The sigmoid colon appeared normal. Biopsies were taken with a cold forceps for histology. The pathology specimen was placed into Bottle Number 6. The rectum appeared normal. The pathology specimen was placed into Bottle Number 7. Overall, very good control of disease state. . (A) Cecum, Biopsy: NO SIGNIFICANT ABNORMALITY. (B) Colon, Ascending, Polypectomy: HYPERPLASTIC POLYP(S). (C) Colon, Ascending, Biopsy: NO SIGNIFICANT ABNORMALITY. (D) Colon, Transverse, Biopsy: NO SIGNIFICANT ABNORMALITY. (E) Colon, Descending, Biopsy: NO SIGNIFICANT ABNORMALITY. (F) Colon, Sigmoid, Biopsy: PATCHY CHRONIC ACTIVE COLITIS, CONSISTENT WITH PARTIALLY TREATED ULCERATIVE COLITIS. Negative for Infectious Organisms, Dysplasia or Malignancy. (G) Rectum, Biopsy: NO SIGNIFICANT ABNORMALITY. . 2021: The ascending colon appeared normal. Biopsies were taken with a cold forceps for histology. The pathology specimen was placed into Bottle Number 1. Findings: The transverse colon appeared normal. Biopsies were taken with a cold forceps for histology. The pathology specimen was placed into Bottle Number 2. The descending colon appeared normal. Biopsies were taken with a cold forceps for histology. The pathology specimen was placed into Bottle Number 3. A polyp was found in the ascending colon. The polyp was sessile. The polyp was removed with a cold snare. It likely represents a benign inflammatory polyp. Resection and retrieval were complete. The pathology specimen was placed into Bottle Number 4.  . (A) Colon, Ascending, Biopsy: NO SIGNIFICANT ABNORMALITY. (B) Colon, Transverse, Biopsy: NO SIGNIFICANT ABNORMALITY. (C) Colon, Descending, Biopsy: NO SIGNIFICANT ABNORMALITY. (D) Colon, Ascending, Polypectomy: TUBULAR ADENOMA(S). (E) Rectum, Biopsy: DIFFUSE CHRONIC ACTIVE PROCTITIS, CONSISTENT WITH ULCERATIVE COLITIS. See Comment. Negative for Infectious Organisms, Dysplasia or Malignancy. COMMENT: Ulcerative colitis that is limited to the rectum has been referred to as ulcerative proctitis. This disease is thought to be less severe and to have a lower rate of dysplasia and adenocarcinoma than cases of ulcerative pan-colitis. . A diffuse area of mildly erythematous mucosa was found in the rectum. This was biopsied with a cold forceps for histology. The pathology specimen was placed into Bottle Number 5. Grande Score 1. . 2017: - The sigmoid colon, descending colon, splenic flexure, transverse colon, hepatic flexure, ascending colon and cecum appeared normal. Biopsies were taken with a cold forceps for histology. This was biopsied with a cold forceps for histology. The pathology specimen was placed into Bottle Number 1. - A localized area of granular mucosa was found in the rectum. Small inflammatory polyp also biopsied. Biopsies were taken with a cold forceps for histology. The pathology specimen was placed into Bottle Number 2. 1.  COLON, NOS:  Colon mucosa.  No colitis or dysplasia identified. 2.  RECTUM:  Mild and focal mild to moderate active chronic proctitis with mild crypt distortion.  No dysplasia identified.  . 2015: Impressions: Normal terminal ileum. Multiple biopsies taken. Normal cecum, ascending colon, hepatic flexure, and transverse colon. Normal vascular pattern. Grande Score 0. Multiple biopsies taken. Normal sigmoid colon.  Normal vascular pattern. Grande Score 0. Multiple biopsies taken. Previous fistula in the rectum was not seen at this time. Mildly erythematous mucosa was found in the rectum. Grande Score 1. Four biopsies taken. Overall, there is significant improvement in appearance of mucosa. . Final Pathologic Diagnosis 1. ILEUM (BIOPSY): Ileal mucosa with no diagnostic alterations. 2. CECUM AND TRANSVERSE COLON (BIOPSY): Fragments of colonic mucosa with mild architectural distortion; no active colitis or dysplasia present; slides from [id number] (6/8/15) are reviewed and there has been an interval improvement in activity, which at that time showed mild to moderately active chronic colitis. 3. SIGMOID (BIOPSY): Fragments of colonic mucosa with no diagnostic alterations; no evidence of colitis; no dysplasia present; slides from [id number] (6/8/15) are reviewed and there has been an interval improvement in activity, which at that time showed moderately active chronic colitis.  4. RECTUM (BIOPSY): Chronic colitis with mild architectural distortion and increased inflammatory cells within the lamina propria; no active colitis identified; no dysplasia present; slides from [id number] (6/8/15) are reviewed and there has been an interval improvement in activity, which at that time showed moderately active chronic colitis. . 2015: Findings: The terminal ileum appeared to be unremarkable. Multiple biopsies were taken from the colon (jar 1). The cecum appeared to be unremarkable. Multiple cold forceps biopsies were taken from the colon  (jar 2). The ascending colon appeared to be unremarkable. Multiple cold forceps biopsies were taken from the colon (jar 3). The transverse colon appeared to be unremarkable. Multiple biopsies were taken from the colon (jar 4). The descending colon appeared to be unremarkable. Multiple biopsies were taken from the colon (jar 5). Mildly erythematous mucosa was found in the sigmoid colon. Multiple biopsies were taken (jar 6).  Mildly erythematous mucosa was found in the rectum. Multiple biopsies were taken (jar 7). A possible fistula was found in the rectum. There was some possible pus like material that was seen and sent for culture. There was a small ulcer at the site and biopsies were taken (jar 8). External hemorrhoids were found. Presence of possible anal fissure. . Final Pathologic Diagnosis 1. ILEUM, BIOPSY: SMALL BOWEL MUCOSA WITHOUT SIGNIFICANT HISTOPATHOLOGIC ABNORMALITY 2. CECUM, BIOPSY: CHRONIC ACTIVE COLITIS, MILD TO MODERATE 3. ASCENDING COLON, BIOPSY: CHRONIC ACTIVE COLITIS, MILD TO MODERATE PANNETH CELL METAPLASIA PRESENT 4. TRANSVERSE COLON, BIOPSY: CHRONIC ACTIVE COLITIS, MODERATE 5. DESCENDING COLON, BIOPSY: CHRONIC ACTIVE COLITIS, MODERATE RARE POORLY FORMED GRANULOMA PRESENT  6. SIGMOID COLON, BIOPSY: CHRONIC ACTIVE COLITIS, MODERATE RARE POORLY FORMED GRANULOMA PRESENT 7. RECTUM, BIOPSY: CHRONIC ACTIVE COLITIS, MARKED 8. FISTULA, BIOPSY: COLORECTAL MUCOSA SHOWING MODERATE CHRONIC ACTIVE COLITIS . Comment Although not definitive, the histologic findings favor ulcerative colitis over Crohn disease. Clinical correlation recommended. . 2014: Findings: Prep fair; small ulcer in TI--random bx taken x 4; Mild inflammation and friability in cecum--bx x 4; AC was unremarkable. Inflammation and friability in TC (bx taken x 8); friability and inflammation in DC and sigmoid colon --bx taken x 8). Minimal inflammation in rectum; Mild hemmorhoids in anal canal.   1. TERMINAL ILEUM, BIOPSY: Small intestinal mucosa with focal active enteritis. 2. CECUM, BIOPSY: Active colitis with cryptitis and without any crypt distortion most consistent with infectious colitis.  3. TRANSVERSE COLON, BIOPSY: Active colitis with cryptitis and without any crypt distortion most consistent with infectious colitis.  4. DESCENDING AND SIGMOID COLON, BIOPSY: Active colitis with cryptitis and without any crypt distortion most consistent with infectious colitis.  Note: Early inflammatory disease cannot be ruled out. Clinical correlation suggested.  . EGD:  2015: Impressions: Normal esophagus. Normal duodenal bulb, 1st portion of the duodenum, 2nd portion of the duodenum, and 3rd portion of the duodenum.  Multiple biopsies taken. Erythematous mucosa was found in the antrum. Four biopsies taken. A few ulcers were found in the body of the stomach (531.90). Multiple biopsies taken. Possible hypertensive portal gastropathy was found in the body of the stomach (572.8). 10. GASTRIC ANTRUM, BIOPSY: CHRONIC SUPERFICIAL GASTRITIS, MILD NO HELICOBACTER IDENITIFIED ON TOLUIDINE BLUE STAIN, WITH CONTROL RESPONDING APPROPRIATELY 11. GASTRIC BODY, BIOPSY: CHRONIC ACTIVE GASTRITIS, MILD NO HELICOBACTER IDENTIFIED ON TOLUIDINE BLUE STAIN

## 2024-10-11 ENCOUNTER — OFFICE VISIT (OUTPATIENT)
Dept: URBAN - METROPOLITAN AREA SURGERY CENTER 18 | Facility: SURGERY CENTER | Age: 39
End: 2024-10-11
Payer: COMMERCIAL

## 2024-10-11 ENCOUNTER — CLAIMS CREATED FROM THE CLAIM WINDOW (OUTPATIENT)
Dept: URBAN - METROPOLITAN AREA CLINIC 4 | Facility: CLINIC | Age: 39
End: 2024-10-11
Payer: COMMERCIAL

## 2024-10-11 DIAGNOSIS — K51.80 OTHER ULCERATIVE COLITIS WITHOUT COMPLICATIONS: ICD-10-CM

## 2024-10-11 DIAGNOSIS — K51.00 ULCERATIVE COLITIS, UNIVERSAL, WITHOUT COMPLICATIONS: ICD-10-CM

## 2024-10-11 DIAGNOSIS — K63.89 OTHER SPECIFIED DISEASES OF INTESTINE: ICD-10-CM

## 2024-10-11 DIAGNOSIS — K51.80 OTHER ULCERATIVE COLITIS WITHOUT COMPLICATION: ICD-10-CM

## 2024-10-11 PROCEDURE — 45380 COLONOSCOPY AND BIOPSY: CPT | Performed by: INTERNAL MEDICINE

## 2024-10-11 PROCEDURE — 00811 ANES LWR INTST NDSC NOS: CPT | Performed by: REGISTERED NURSE

## 2024-10-11 PROCEDURE — 00812 ANES LWR INTST SCR COLSC: CPT | Performed by: REGISTERED NURSE

## 2024-10-11 PROCEDURE — 88305 TISSUE EXAM BY PATHOLOGIST: CPT | Performed by: PATHOLOGY

## 2024-10-11 RX ORDER — CHLORHEXIDINE GLUCONATE 1.2 MG/ML
10 ML RINSE ORAL BID
Qty: 600 ML | Refills: 2 | COMMUNITY

## 2024-10-11 RX ORDER — SULFASALAZINE 500 MG/1
9 TABLET TABLET ORAL ONCE A DAY
Qty: 810 TABLET | Refills: 4 | COMMUNITY
End: 2025-04-13

## 2024-10-11 RX ORDER — USTEKINUMAB 90 MG/ML
1 INJECTION INJECTION, SOLUTION SUBCUTANEOUS ONCE
Qty: 1 | Refills: 11 | COMMUNITY

## 2024-10-11 RX ORDER — BALSALAZIDE DISODIUM 750 MG/1
2 CAPSULES CAPSULE ORAL ONCE DAILY
Qty: 60 | Refills: 11 | COMMUNITY

## 2025-07-11 NOTE — HPI-TODAY'S VISIT:
Mrs Alexis is a 37 year old  individual who presents for follow up of pan-UC with sophie-rectal fistula, on sulfasalazine 9 tab, and imuran 100 mg, (started 6/3/2015 and stopped , restarted and stopped 2022), here for follow-up. . She has a history of development of rashes on her face, legs, and neck in -10.  The symptoms would come and go.  No formal diagnosis was made.  She was given supportive management and the rash resolved. . She reports development of oral ulcers in 2013.  She had the symptoms for one month.  She used anti-virals (including valtrex). . She developed rectal bleeding in early .  She had a colonoscopy performed in 2014.  The biopsies revelaed infectious colitis. A colonoscopy was also performed 2014.  It revealed a normal terminal ileum, ulcerations within the ascending colon and hepatic flexure (with my interpretation of normal surrounding mucosa based on the pictures), and inflammation within the rectum.  Biopsies from the ascending colon showed some chronic inflammation consisting of lymphocytes and plasma cells being increased in the lamina propria.  The architectural changes and gland dropout were mild.  Possibility of UC was raised.  Biopsies of the rectum showed occasional neutrophils with no chronic inflammation or architectural changes.  The possibility of an infectious colitis or early IBD was raised. She started prednisone on 2015, 40mg, which has helped her oral ulcers.  Since starting the prednisone, her bowel movements improved, too. She was started on imuran 50mg and Lialda on 6/10/2015. We performed a colonoscopy on 2015 at [facility].  She had Grande 0 to 1 endoscopic disease with biopsies showing chronic colitis with mild architectural distortion in the rectum and normal biopsies throughout the remainder of the colon.  The biopsies, when compared to previous, were improved. She was also diagnosed with clostridium difficile and given a course of vancomycin and flagyl. . She had a colonoscopy performed on 2015.  She had Grande 0 to 1 endoscopic disease with biopsies showing chronic colitis with mild architectural distortion in the rectum and normal biopsies throughout the remainder of the colon.  The biopsies, when compared to previous, were improved. She was also diagnosed with clostridium difficile and given a course of vancomycin and flagyl. She had  delivery on 2016.  The rash she had developed on her skin only improved in 2018. Her UC has been stable; 1 bowel movement per day, no rectal bleeding.  She is 16 weeks pregnant at this time. . Previously on 2020, pt reports that she has normal BM, no pain, no fistula drainage.  She has gone weeks without going on therapy.  Some fatigue. . Previously on 5/3/2021, pt reports that after getting the second COVID shot, she had systemic response.  She also started to develop ulcers in her mouth as well.  She started back on the Imuran 2 weeks ago because of this.  No diarrhea, no rectal bleeding present.  Lot of pain as well in her throat.  Lymph nodes are swollen as well, lot of systemic fatigue.  Was given Medrol dose pack and augmentin plus norco, which helped her significantly. Previously on 2021, pt reports that she has had improvement in her flare that she has improved after taking the imuran . Previously on 2022, pt reports that she is doing very well; no diarrhea/no abd pain. . Previously on 2023, pt reports that her GI is doing well, no diarrhea, no rectal bleeding.  But, she is having severe mouth ulcerations.  She was given nystatin with another medication mouth wash, which has helped.  She even stopped sulfa as well. . Today on 3/31/2023, pt reports that her GI sxs are still very well controlled.  However, mouth ulcers are coming back, dentist is going to give her steroid.  She forgot to restart the sulfasalazine (we did trial of stopping to see if any impact on her mouth ulcers). . PMH: Anal fissures, UC MEDICATIONS: Cymbalta 60mg; Luvox 100mg; Sulfasalazine . Social History:  No smoke or etoh; Lives with  in [place]; ; Phone:[phone] Family History:  Mom with colon polyp; no GI Malignancy or IBD . LABS/Studies:  OSH labs: 2021: wbc 10.5, Hgb 13.6, Plt 155, ,  . Ref. Range 2016 11:34 5/10/2017 12:19 Glucose Latest Ref Range: 74 - 106 mg/dL 84 91 Sodium Latest Ref Range: 136 - 145 mmol/L 137 143 Potassium Latest Ref Range: 3.4 - 4.5 mmol/L 4.4 4.8 (H) Chloride Latest Ref Range: 98 - 107 mmol/L 101 103 CO2 Latest Ref Range: 22 - 29 mmol/L 23 24 Anion Gap Latest Ref Range: 6.0 - 14.0 mmol/L 13.0 16.0 (H) BUN Latest Ref Range: 6.0 - 20.0 mg/dL 6.0 14.0 Creatinine Serum/WB Latest Ref Range: 0.51 - 0.95 mg/dL 0.49 (L) 0.61 eGFR Latest Units: ml/min/1.73m >60 >=60 Calcium Latest Ref Range: 8.6 - 10.2 mg/dL 8.9 9.2 Total Protein Latest Ref Range: 6.6 - 8.7 g/dL 6.7 7.3 Albumin Latest Ref Range: 3.5 - 5.5 g/dL 3.9 4.7 Alkaline Phosphatase Latest Ref Range: 35 - 104 U/L 73 94 Total Bilirubin Latest Ref Range: 0.15 - 1.00 mg/dL 0.33 0.28 ALT (SGPT) Latest Ref Range: 0 - 33 U/L 6 8 AST (SGOT) Latest Ref Range: 0 - 32 U/L 18 18 Ferritin Latest Ref Range: 13 - 150 ng/mL  98 Iron Latest Ref Range: 37 - 145 mcg/dL  53 Total Iron Binding Capacity Latest Ref Range: 250 - 450 mcg/dL  191 (L) Unsaturated Iron Binding Capacity Latest Ref Range: 112 - 346 ug/dL  138 Iron Saturation Latest Ref Range: 15 - 50 %  28 Vitamin B-12 Latest Ref Range: 213 - 816 pg/mL  373 White Blood Cell Count Latest Ref Range: 4.0 - 10.0 TH/cmm 15.0 (H) 6.8 Red Blood Cell Count Latest Ref Range: 3.80 - 4.80 M/cmm 3.91 4.17 Hemoglobin Latest Ref Range: 12.0 - 15.0 g/dL 12.9 13.3 Hematocrit Latest Ref Range: 36.0 - 46.0 % 39.9 41.8 MCV Latest Ref Range: 83.0 - 101.0 fl 102.0 (H) 100.2 MCH Latest Ref Range: 27.0 - 32.0 pg 33.0 (H) 31.9 MCHC Latest Ref Range: 31.5 - 34.5 g/dL 32.3 31.8 RDW - SD Latest Ref Range: 36.4 - 43.3 fL 47.4 (H) 45.1 (H) Platelet Count Latest Ref Range: 150 - 400 TH/cmm 126 (L) 160 MPV Latest Ref Range: 9.4 - 12.3 fl 11.6 11.8 Segmented Neutrophils Latest Ref Range: 44.0 - 65.0 % 75.5 (H) 56.1 Lymphocytes Latest Ref Range: 25.0 - 46.0 % 14.0 (L) 32.3 Monocytes Latest Ref Range: 1.0 - 10.0 % 4.5 6.0 Eosinophils Latest Ref Range: 0.0 - 9.0 % 1.0 4.7 Basophils Latest Ref Range: 0.0 - 4.0 % 0.7 0.6 IMMATURE GRANULOCYTES Latest Ref Range: 0.0 - 0.6 % 4.3 (H) 0.3 NUCLEATED RBC / AUTO DIFF Latest Ref Range: less than=0.0 /100WBC 0.0 0.0 SEGMENTED NEUTROPHILS ABSOLUTE / AUTO DIFF Latest Units: TH/cmm 11.35 3.80 LYMPHOCYTE ABSOLUTE / AUTO DIFF Latest Units: TH/cmm 2.10 2.19 MONOCYTES ABSOLUTE / AUTO DIFF Latest Units: TH/cmm 0.67 0.41 EOSINOPHIL ABSOLUTE / AUTO DIFF Latest Units: TH/cmm 0.15 0.32 BASOPHIL ABSOLUTE / AUTO DIFF Latest Units: TH/cmm 0.11 0.04 IMMATURE GRANULOCYTES ABSOLUTE / AUTO DIFF Latest Units: TH/cmm 0.65 0.02 NRBC ABSOLUTE / AUTO DIFF Latest Units: TH/cmm 0.00 0.00 C-Reactive Protein Latest Ref Range: 0.0 - 0.5 mg/dL 0.1  . MRE abd/pelv 2015: IMPRESSION:  The study is limited by a paucity of intraperitoneal fat. There are no definitive signs of bowel thickening or abnormal bowel enhancement to indicate inflammatory bowel disease.  Physiologic cysts on both ovaries. Nabothian cysts in the uterine cervix. Small polypoid lesion, seen within the endometrial canal measuring less than a centimeter, could represent a polyp or submucosal fibroid . Procedures: Colonoscopy: 2022:The cecum appeared normal. Biopsies were taken with a cold forceps for histology. The pathology specimen was placed into Bottle Number 1. Findings: A possible 10 mm polyp was found in the ascending colon. The polyp was flat. The polyp was removed with a combination of jumbo cold forceps and with a cold snare. Resection and retrieval were complete. The pathology specimen was placed into Bottle Number 2. To prevent bleeding after the polypectomy, one hemostatic clip was successfully placed. There was no bleeding at the end of the procedure. The ascending colon appeared normal. Biopsies were taken with a cold forceps for histology. The pathology specimen was placed into Bottle Number 3. The transverse colon appeared normal. Biopsies were taken with a cold forceps for histology. The pathology specimen was placed into Bottle Number 4. The descending colon appeared normal. Biopsies were taken with a cold forceps for histology. The pathology specimen was placed into Bottle Number 5. The sigmoid colon appeared normal. Biopsies were taken with a cold forceps for histology. The pathology specimen was placed into Bottle Number 6. The rectum appeared normal. The pathology specimen was placed into Bottle Number 7. Overall, very good control of disease state. . (A) Cecum, Biopsy: NO SIGNIFICANT ABNORMALITY. (B) Colon, Ascending, Polypectomy: HYPERPLASTIC POLYP(S). (C) Colon, Ascending, Biopsy: NO SIGNIFICANT ABNORMALITY. (D) Colon, Transverse, Biopsy: NO SIGNIFICANT ABNORMALITY. (E) Colon, Descending, Biopsy: NO SIGNIFICANT ABNORMALITY. (F) Colon, Sigmoid, Biopsy: PATCHY CHRONIC ACTIVE COLITIS, CONSISTENT WITH PARTIALLY TREATED ULCERATIVE COLITIS. Negative for Infectious Organisms, Dysplasia or Malignancy. (G) Rectum, Biopsy: NO SIGNIFICANT ABNORMALITY. . 2021: The ascending colon appeared normal. Biopsies were taken with a cold forceps for histology. The pathology specimen was placed into Bottle Number 1. Findings: The transverse colon appeared normal. Biopsies were taken with a cold forceps for histology. The pathology specimen was placed into Bottle Number 2. The descending colon appeared normal. Biopsies were taken with a cold forceps for histology. The pathology specimen was placed into Bottle Number 3. A polyp was found in the ascending colon. The polyp was sessile. The polyp was removed with a cold snare. It likely represents a benign inflammatory polyp. Resection and retrieval were complete. The pathology specimen was placed into Bottle Number 4.  . (A) Colon, Ascending, Biopsy: NO SIGNIFICANT ABNORMALITY. (B) Colon, Transverse, Biopsy: NO SIGNIFICANT ABNORMALITY. (C) Colon, Descending, Biopsy: NO SIGNIFICANT ABNORMALITY. (D) Colon, Ascending, Polypectomy: TUBULAR ADENOMA(S). (E) Rectum, Biopsy: DIFFUSE CHRONIC ACTIVE PROCTITIS, CONSISTENT WITH ULCERATIVE COLITIS. See Comment. Negative for Infectious Organisms, Dysplasia or Malignancy. COMMENT: Ulcerative colitis that is limited to the rectum has been referred to as ulcerative proctitis. This disease is thought to be less severe and to have a lower rate of dysplasia and adenocarcinoma than cases of ulcerative pan-colitis. . A diffuse area of mildly erythematous mucosa was found in the rectum. This was biopsied with a cold forceps for histology. The pathology specimen was placed into Bottle Number 5. Grande Score 1. . 2017: - The sigmoid colon, descending colon, splenic flexure, transverse colon, hepatic flexure, ascending colon and cecum appeared normal. Biopsies were taken with a cold forceps for histology. This was biopsied with a cold forceps for histology. The pathology specimen was placed into Bottle Number 1. - A localized area of granular mucosa was found in the rectum. Small inflammatory polyp also biopsied. Biopsies were taken with a cold forceps for histology. The pathology specimen was placed into Bottle Number 2. 1.  COLON, NOS:  Colon mucosa.  No colitis or dysplasia identified. 2.  RECTUM:  Mild and focal mild to moderate active chronic proctitis with mild crypt distortion.  No dysplasia identified.  . 2015: Impressions: Normal terminal ileum. Multiple biopsies taken. Normal cecum, ascending colon, hepatic flexure, and transverse colon. Normal vascular pattern. Grande Score 0. Multiple biopsies taken. Normal sigmoid colon.  Normal vascular pattern. Grande Score 0. Multiple biopsies taken. Previous fistula in the rectum was not seen at this time. Mildly erythematous mucosa was found in the rectum. Grande Score 1. Four biopsies taken. Overall, there is significant improvement in appearance of mucosa. . Final Pathologic Diagnosis 1. ILEUM (BIOPSY): Ileal mucosa with no diagnostic alterations. 2. CECUM AND TRANSVERSE COLON (BIOPSY): Fragments of colonic mucosa with mild architectural distortion; no active colitis or dysplasia present; slides from [id number] (6/8/15) are reviewed and there has been an interval improvement in activity, which at that time showed mild to moderately active chronic colitis. 3. SIGMOID (BIOPSY): Fragments of colonic mucosa with no diagnostic alterations; no evidence of colitis; no dysplasia present; slides from [id number] (6/8/15) are reviewed and there has been an interval improvement in activity, which at that time showed moderately active chronic colitis.  4. RECTUM (BIOPSY): Chronic colitis with mild architectural distortion and increased inflammatory cells within the lamina propria; no active colitis identified; no dysplasia present; slides from [id number] (6/8/15) are reviewed and there has been an interval improvement in activity, which at that time showed moderately active chronic colitis. . 2015: Findings: The terminal ileum appeared to be unremarkable. Multiple biopsies were taken from the colon (jar 1). The cecum appeared to be unremarkable. Multiple cold forceps biopsies were taken from the colon  (jar 2). The ascending colon appeared to be unremarkable. Multiple cold forceps biopsies were taken from the colon (jar 3). The transverse colon appeared to be unremarkable. Multiple biopsies were taken from the colon (jar 4). The descending colon appeared to be unremarkable. Multiple biopsies were taken from the colon (jar 5). Mildly erythematous mucosa was found in the sigmoid colon. Multiple biopsies were taken (jar 6).  Mildly erythematous mucosa was found in the rectum. Multiple biopsies were taken (jar 7). A possible fistula was found in the rectum. There was some possible pus like material that was seen and sent for culture. There was a small ulcer at the site and biopsies were taken (jar 8). External hemorrhoids were found. Presence of possible anal fissure. . Final Pathologic Diagnosis 1. ILEUM, BIOPSY: SMALL BOWEL MUCOSA WITHOUT SIGNIFICANT HISTOPATHOLOGIC ABNORMALITY 2. CECUM, BIOPSY: CHRONIC ACTIVE COLITIS, MILD TO MODERATE 3. ASCENDING COLON, BIOPSY: CHRONIC ACTIVE COLITIS, MILD TO MODERATE PANNETH CELL METAPLASIA PRESENT 4. TRANSVERSE COLON, BIOPSY: CHRONIC ACTIVE COLITIS, MODERATE 5. DESCENDING COLON, BIOPSY: CHRONIC ACTIVE COLITIS, MODERATE RARE POORLY FORMED GRANULOMA PRESENT  6. SIGMOID COLON, BIOPSY: CHRONIC ACTIVE COLITIS, MODERATE RARE POORLY FORMED GRANULOMA PRESENT 7. RECTUM, BIOPSY: CHRONIC ACTIVE COLITIS, MARKED 8. FISTULA, BIOPSY: COLORECTAL MUCOSA SHOWING MODERATE CHRONIC ACTIVE COLITIS . Comment Although not definitive, the histologic findings favor ulcerative colitis over Crohn disease. Clinical correlation recommended. . 2014: Findings: Prep fair; small ulcer in TI--random bx taken x 4; Mild inflammation and friability in cecum--bx x 4; AC was unremarkable. Inflammation and friability in TC (bx taken x 8); friability and inflammation in DC and sigmoid colon --bx taken x 8). Minimal inflammation in rectum; Mild hemmorhoids in anal canal.   1. TERMINAL ILEUM, BIOPSY: Small intestinal mucosa with focal active enteritis. 2. CECUM, BIOPSY: Active colitis with cryptitis and without any crypt distortion most consistent with infectious colitis.  3. TRANSVERSE COLON, BIOPSY: Active colitis with cryptitis and without any crypt distortion most consistent with infectious colitis.  4. DESCENDING AND SIGMOID COLON, BIOPSY: Active colitis with cryptitis and without any crypt distortion most consistent with infectious colitis.  Note: Early inflammatory disease cannot be ruled out. Clinical correlation suggested.  . EGD:  2015: Impressions: Normal esophagus. Normal duodenal bulb, 1st portion of the duodenum, 2nd portion of the duodenum, and 3rd portion of the duodenum.  Multiple biopsies taken. Erythematous mucosa was found in the antrum. Four biopsies taken. A few ulcers were found in the body of the stomach (531.90). Multiple biopsies taken. Possible hypertensive portal gastropathy was found in the body of the stomach (572.8). 10. GASTRIC ANTRUM, BIOPSY: CHRONIC SUPERFICIAL GASTRITIS, MILD NO HELICOBACTER IDENITIFIED ON TOLUIDINE BLUE STAIN, WITH CONTROL RESPONDING APPROPRIATELY 11. GASTRIC BODY, BIOPSY: CHRONIC ACTIVE GASTRITIS, MILD NO HELICOBACTER IDENTIFIED ON TOLUIDINE BLUE STAIN
The patient is a 74y Male complaining of abdominal pain.